# Patient Record
Sex: FEMALE | Race: WHITE | URBAN - METROPOLITAN AREA
[De-identification: names, ages, dates, MRNs, and addresses within clinical notes are randomized per-mention and may not be internally consistent; named-entity substitution may affect disease eponyms.]

---

## 2020-09-24 ENCOUNTER — APPOINTMENT (OUTPATIENT)
Age: 40
Setting detail: DERMATOLOGY
End: 2020-09-24

## 2020-09-24 DIAGNOSIS — D22 MELANOCYTIC NEVI: ICD-10-CM

## 2020-09-24 DIAGNOSIS — Z71.89 OTHER SPECIFIED COUNSELING: ICD-10-CM

## 2020-09-24 DIAGNOSIS — L57.8 OTHER SKIN CHANGES DUE TO CHRONIC EXPOSURE TO NONIONIZING RADIATION: ICD-10-CM

## 2020-09-24 DIAGNOSIS — D18.0 HEMANGIOMA: ICD-10-CM

## 2020-09-24 DIAGNOSIS — L82.1 OTHER SEBORRHEIC KERATOSIS: ICD-10-CM

## 2020-09-24 PROBLEM — D22.71 MELANOCYTIC NEVI OF RIGHT LOWER LIMB, INCLUDING HIP: Status: ACTIVE | Noted: 2020-09-24

## 2020-09-24 PROBLEM — D22.62 MELANOCYTIC NEVI OF LEFT UPPER LIMB, INCLUDING SHOULDER: Status: ACTIVE | Noted: 2020-09-24

## 2020-09-24 PROBLEM — D22.5 MELANOCYTIC NEVI OF TRUNK: Status: ACTIVE | Noted: 2020-09-24

## 2020-09-24 PROBLEM — D22.61 MELANOCYTIC NEVI OF RIGHT UPPER LIMB, INCLUDING SHOULDER: Status: ACTIVE | Noted: 2020-09-24

## 2020-09-24 PROBLEM — D48.5 NEOPLASM OF UNCERTAIN BEHAVIOR OF SKIN: Status: ACTIVE | Noted: 2020-09-24

## 2020-09-24 PROBLEM — D18.01 HEMANGIOMA OF SKIN AND SUBCUTANEOUS TISSUE: Status: ACTIVE | Noted: 2020-09-24

## 2020-09-24 PROBLEM — D22.72 MELANOCYTIC NEVI OF LEFT LOWER LIMB, INCLUDING HIP: Status: ACTIVE | Noted: 2020-09-24

## 2020-09-24 PROCEDURE — OTHER COUNSELING: OTHER

## 2020-09-24 PROCEDURE — OTHER DEFER: OTHER

## 2020-09-24 PROCEDURE — OTHER MIPS QUALITY: OTHER

## 2020-09-24 PROCEDURE — 99203 OFFICE O/P NEW LOW 30 MIN: CPT

## 2020-09-24 PROCEDURE — OTHER ADDITIONAL NOTES: OTHER

## 2020-09-24 PROCEDURE — OTHER SUNSCREEN RECOMMENDATIONS: OTHER

## 2020-09-24 ASSESSMENT — LOCATION DETAILED DESCRIPTION DERM
LOCATION DETAILED: LEFT ANTERIOR SHOULDER
LOCATION DETAILED: LEFT PROXIMAL POSTERIOR UPPER ARM
LOCATION DETAILED: INFERIOR THORACIC SPINE
LOCATION DETAILED: RIGHT DISTAL PRETIBIAL REGION
LOCATION DETAILED: LEFT INFRAMAMMARY CREASE (INNER QUADRANT)
LOCATION DETAILED: MIDDLE STERNUM
LOCATION DETAILED: LEFT ANTERIOR DISTAL THIGH
LOCATION DETAILED: LOWER STERNUM
LOCATION DETAILED: RIGHT RIB CAGE
LOCATION DETAILED: LEFT LATERAL ABDOMEN
LOCATION DETAILED: LEFT DISTAL DORSAL FOREARM
LOCATION DETAILED: LEFT INFERIOR LATERAL MIDBACK
LOCATION DETAILED: RIGHT DISTAL DORSAL FOREARM
LOCATION DETAILED: SUPERIOR THORACIC SPINE
LOCATION DETAILED: RIGHT INFERIOR CENTRAL MALAR CHEEK
LOCATION DETAILED: RIGHT LATERAL PROXIMAL UPPER ARM
LOCATION DETAILED: RIGHT PROXIMAL PRETIBIAL REGION
LOCATION DETAILED: LEFT DISTAL PRETIBIAL REGION
LOCATION DETAILED: RIGHT ANTERIOR SHOULDER
LOCATION DETAILED: RIGHT PROXIMAL POSTERIOR UPPER ARM
LOCATION DETAILED: LEFT INFERIOR CENTRAL MALAR CHEEK

## 2020-09-24 ASSESSMENT — LOCATION SIMPLE DESCRIPTION DERM
LOCATION SIMPLE: RIGHT UPPER ARM
LOCATION SIMPLE: CHEST
LOCATION SIMPLE: LEFT SHOULDER
LOCATION SIMPLE: LEFT PRETIBIAL REGION
LOCATION SIMPLE: RIGHT CHEEK
LOCATION SIMPLE: LEFT LOWER BACK
LOCATION SIMPLE: ABDOMEN
LOCATION SIMPLE: RIGHT POSTERIOR UPPER ARM
LOCATION SIMPLE: LEFT BREAST
LOCATION SIMPLE: LEFT THIGH
LOCATION SIMPLE: LEFT FOREARM
LOCATION SIMPLE: RIGHT SHOULDER
LOCATION SIMPLE: LEFT CHEEK
LOCATION SIMPLE: UPPER BACK
LOCATION SIMPLE: LEFT POSTERIOR UPPER ARM
LOCATION SIMPLE: RIGHT FOREARM
LOCATION SIMPLE: RIGHT PRETIBIAL REGION

## 2020-09-24 ASSESSMENT — LOCATION ZONE DERM
LOCATION ZONE: TRUNK
LOCATION ZONE: FACE
LOCATION ZONE: LEG
LOCATION ZONE: ARM

## 2020-09-24 NOTE — PROCEDURE: MIPS QUALITY
Detail Level: Detailed
Quality 402: Tobacco Use And Help With Quitting Among Adolescents: Patient screened for tobacco and is an ex-smoker
Quality 110: Preventive Care And Screening: Influenza Immunization: Influenza Immunization Administered during Influenza season
Quality 226: Preventive Care And Screening: Tobacco Use: Screening And Cessation Intervention: Patient screened for tobacco use and is an ex/non-smoker

## 2020-09-24 NOTE — PROCEDURE: ADDITIONAL NOTES
Detail Level: Simple
Additional Notes: Patient is going on Lake/fishing trip and will schedule biopsies once she returns.

## 2021-01-21 ENCOUNTER — APPOINTMENT (OUTPATIENT)
Age: 41
Setting detail: DERMATOLOGY
End: 2021-01-21

## 2021-01-21 DIAGNOSIS — D485 NEOPLASM OF UNCERTAIN BEHAVIOR OF SKIN: ICD-10-CM

## 2021-01-21 DIAGNOSIS — L57.8 OTHER SKIN CHANGES DUE TO CHRONIC EXPOSURE TO NONIONIZING RADIATION: ICD-10-CM

## 2021-01-21 DIAGNOSIS — L90.5 SCAR CONDITIONS AND FIBROSIS OF SKIN: ICD-10-CM

## 2021-01-21 PROBLEM — D48.5 NEOPLASM OF UNCERTAIN BEHAVIOR OF SKIN: Status: ACTIVE | Noted: 2021-01-21

## 2021-01-21 PROCEDURE — OTHER TREATMENT REGIMEN: OTHER

## 2021-01-21 PROCEDURE — 11102 TANGNTL BX SKIN SINGLE LES: CPT

## 2021-01-21 PROCEDURE — 99213 OFFICE O/P EST LOW 20 MIN: CPT | Mod: 25

## 2021-01-21 PROCEDURE — 11103 TANGNTL BX SKIN EA SEP/ADDL: CPT

## 2021-01-21 PROCEDURE — OTHER COUNSELING: OTHER

## 2021-01-21 PROCEDURE — OTHER BIOPSY BY SHAVE METHOD: OTHER

## 2021-01-21 PROCEDURE — OTHER MIPS QUALITY: OTHER

## 2021-01-21 ASSESSMENT — LOCATION DETAILED DESCRIPTION DERM
LOCATION DETAILED: RIGHT PERIAREOLAR BREAST 12-1:00 REGION
LOCATION DETAILED: LEFT INFERIOR CENTRAL MALAR CHEEK
LOCATION DETAILED: RIGHT LATERAL PROXIMAL UPPER ARM
LOCATION DETAILED: MIDDLE STERNUM
LOCATION DETAILED: LEFT RIB CAGE
LOCATION DETAILED: LOWER STERNUM

## 2021-01-21 ASSESSMENT — LOCATION ZONE DERM
LOCATION ZONE: FACE
LOCATION ZONE: TRUNK
LOCATION ZONE: ARM

## 2021-01-21 ASSESSMENT — LOCATION SIMPLE DESCRIPTION DERM
LOCATION SIMPLE: LEFT CHEEK
LOCATION SIMPLE: RIGHT BREAST
LOCATION SIMPLE: RIGHT UPPER ARM
LOCATION SIMPLE: ABDOMEN
LOCATION SIMPLE: CHEST

## 2021-01-21 NOTE — PROCEDURE: TREATMENT REGIMEN
Detail Level: Zone
Plan: Follow up with plastic surgeon\\nFaxed medical release form for patients biopsy results

## 2021-01-21 NOTE — PROCEDURE: BIOPSY BY SHAVE METHOD
Validate Lesion Size: No
Type Of Destruction Used: Curettage
Depth Of Biopsy: dermis
Notification Instructions: Patient will be notified of biopsy results. However, patient instructed to call the office if not contacted within 2 weeks.
Biopsy Type: H and E
Electrodesiccation And Curettage Text: The wound bed was treated with electrodesiccation and curettage after the biopsy was performed.
Electrodesiccation Text: The wound bed was treated with electrodesiccation after the biopsy was performed.
Size Of Lesion In Cm: 0.5
Wound Care: Petrolatum
Anesthesia Type: 1% lidocaine with epinephrine
Dressing: bandage
Additional Anesthesia Volume In Cc (Will Not Render If 0): 0
Post-Care Instructions: I reviewed with the patient in detail post-care instructions. Patient is to keep the biopsy site dry overnight, and then apply bacitracin twice daily until healed. Patient may apply hydrogen peroxide soaks to remove any crusting.
Cryotherapy Text: The wound bed was treated with cryotherapy after the biopsy was performed.
Silver Nitrate Text: The wound bed was treated with silver nitrate after the biopsy was performed.
Billing Type: Third-Party Bill
Curettage Text: The wound bed was treated with curettage after the biopsy was performed.
Biopsy Method: Dermablade
Detail Level: Detailed
Consent: Written consent was obtained and risks were reviewed including but not limited to scarring, infection, bleeding, scabbing, incomplete removal, nerve damage and allergy to anesthesia.
Hemostasis: Drysol
Was A Bandage Applied: Yes
Information: Selecting Yes will display possible errors in your note based on the variables you have selected. This validation is only offered as a suggestion for you. PLEASE NOTE THAT THE VALIDATION TEXT WILL BE REMOVED WHEN YOU FINALIZE YOUR NOTE. IF YOU WANT TO FAX A PRELIMINARY NOTE YOU WILL NEED TO TOGGLE THIS TO 'NO' IF YOU DO NOT WANT IT IN YOUR FAXED NOTE.
Size Of Lesion In Cm: 0.6

## 2021-02-04 ENCOUNTER — RX ONLY (RX ONLY)
Age: 41
End: 2021-02-04

## 2021-02-04 ENCOUNTER — APPOINTMENT (OUTPATIENT)
Age: 41
Setting detail: DERMATOLOGY
End: 2021-02-05

## 2021-02-04 DIAGNOSIS — D485 NEOPLASM OF UNCERTAIN BEHAVIOR OF SKIN: ICD-10-CM

## 2021-02-04 DIAGNOSIS — B00.1 HERPESVIRAL VESICULAR DERMATITIS: ICD-10-CM

## 2021-02-04 DIAGNOSIS — L91.0 HYPERTROPHIC SCAR: ICD-10-CM

## 2021-02-04 DIAGNOSIS — L57.8 OTHER SKIN CHANGES DUE TO CHRONIC EXPOSURE TO NONIONIZING RADIATION: ICD-10-CM

## 2021-02-04 PROBLEM — D48.5 NEOPLASM OF UNCERTAIN BEHAVIOR OF SKIN: Status: ACTIVE | Noted: 2021-02-04

## 2021-02-04 PROCEDURE — OTHER BIOPSY BY SHAVE METHOD: OTHER

## 2021-02-04 PROCEDURE — 99213 OFFICE O/P EST LOW 20 MIN: CPT | Mod: 25

## 2021-02-04 PROCEDURE — OTHER TREATMENT REGIMEN: OTHER

## 2021-02-04 PROCEDURE — 11102 TANGNTL BX SKIN SINGLE LES: CPT

## 2021-02-04 PROCEDURE — 11103 TANGNTL BX SKIN EA SEP/ADDL: CPT

## 2021-02-04 PROCEDURE — OTHER COUNSELING: OTHER

## 2021-02-04 PROCEDURE — OTHER MIPS QUALITY: OTHER

## 2021-02-04 RX ORDER — ACYCLOVIR 200 MG/1
CAPSULE ORAL
Qty: 25 | Refills: 3 | Status: ERX | COMMUNITY
Start: 2021-02-04

## 2021-02-04 ASSESSMENT — LOCATION ZONE DERM
LOCATION ZONE: TRUNK
LOCATION ZONE: LIP
LOCATION ZONE: NECK
LOCATION ZONE: LEG

## 2021-02-04 ASSESSMENT — LOCATION SIMPLE DESCRIPTION DERM
LOCATION SIMPLE: RIGHT LOWER BACK
LOCATION SIMPLE: LEFT THIGH
LOCATION SIMPLE: LEFT LIP
LOCATION SIMPLE: POSTERIOR NECK
LOCATION SIMPLE: LEFT UPPER BACK

## 2021-02-04 ASSESSMENT — LOCATION DETAILED DESCRIPTION DERM
LOCATION DETAILED: LEFT ANTERIOR DISTAL THIGH
LOCATION DETAILED: RIGHT MEDIAL TRAPEZIAL NECK
LOCATION DETAILED: RIGHT INFERIOR LATERAL LOWER BACK
LOCATION DETAILED: LEFT MID-UPPER BACK
LOCATION DETAILED: LEFT INFERIOR VERMILION LIP
LOCATION DETAILED: LEFT INFERIOR UPPER BACK

## 2021-02-04 NOTE — PROCEDURE: BIOPSY BY SHAVE METHOD
Billing Type: Third-Party Bill
Electrodesiccation And Curettage Text: The wound bed was treated with electrodesiccation and curettage after the biopsy was performed.
Information: Selecting Yes will display possible errors in your note based on the variables you have selected. This validation is only offered as a suggestion for you. PLEASE NOTE THAT THE VALIDATION TEXT WILL BE REMOVED WHEN YOU FINALIZE YOUR NOTE. IF YOU WANT TO FAX A PRELIMINARY NOTE YOU WILL NEED TO TOGGLE THIS TO 'NO' IF YOU DO NOT WANT IT IN YOUR FAXED NOTE.
Hide Anesthesia Volume?: No
Silver Nitrate Text: The wound bed was treated with silver nitrate after the biopsy was performed.
Size Of Lesion In Cm: 0
Notification Instructions: Patient will be notified of biopsy results. However, patient instructed to call the office if not contacted within 2 weeks.
Hemostasis: Drysol
Dressing: bandage
Biopsy Method: Dermablade
Type Of Destruction Used: Curettage
Detail Level: Detailed
Electrodesiccation Text: The wound bed was treated with electrodesiccation after the biopsy was performed.
Depth Of Biopsy: dermis
Biopsy Type: H and E
Anesthesia Type: 1% lidocaine with epinephrine
Cryotherapy Text: The wound bed was treated with cryotherapy after the biopsy was performed.
Was A Bandage Applied: Yes
Anesthesia Volume In Cc (Will Not Render If 0): 0.5
Curettage Text: The wound bed was treated with curettage after the biopsy was performed.
Wound Care: Petrolatum
Post-Care Instructions: I reviewed with the patient in detail post-care instructions. Patient is to keep the biopsy site dry overnight, and then apply bacitracin twice daily until healed. Patient may apply hydrogen peroxide soaks to remove any crusting.
Consent: Written consent was obtained and risks were reviewed including but not limited to scarring, infection, bleeding, scabbing, incomplete removal, nerve damage and allergy to anesthesia.

## 2021-03-19 ENCOUNTER — APPOINTMENT (OUTPATIENT)
Age: 41
Setting detail: DERMATOLOGY
End: 2021-03-19

## 2021-03-19 DIAGNOSIS — L57.8 OTHER SKIN CHANGES DUE TO CHRONIC EXPOSURE TO NONIONIZING RADIATION: ICD-10-CM

## 2021-03-19 DIAGNOSIS — D485 NEOPLASM OF UNCERTAIN BEHAVIOR OF SKIN: ICD-10-CM

## 2021-03-19 DIAGNOSIS — Z71.89 OTHER SPECIFIED COUNSELING: ICD-10-CM

## 2021-03-19 PROBLEM — D48.5 NEOPLASM OF UNCERTAIN BEHAVIOR OF SKIN: Status: ACTIVE | Noted: 2021-03-19

## 2021-03-19 PROCEDURE — OTHER BIOPSY BY SHAVE METHOD: OTHER

## 2021-03-19 PROCEDURE — 11102 TANGNTL BX SKIN SINGLE LES: CPT

## 2021-03-19 PROCEDURE — OTHER COUNSELING: OTHER

## 2021-03-19 PROCEDURE — 99213 OFFICE O/P EST LOW 20 MIN: CPT | Mod: 25

## 2021-03-19 PROCEDURE — 11103 TANGNTL BX SKIN EA SEP/ADDL: CPT

## 2021-03-19 ASSESSMENT — LOCATION SIMPLE DESCRIPTION DERM
LOCATION SIMPLE: SUPERIOR FOREHEAD
LOCATION SIMPLE: LEFT UPPER BACK
LOCATION SIMPLE: LEFT LOWER BACK
LOCATION SIMPLE: RIGHT UPPER BACK
LOCATION SIMPLE: CHEST

## 2021-03-19 ASSESSMENT — LOCATION DETAILED DESCRIPTION DERM
LOCATION DETAILED: SUPERIOR MID FOREHEAD
LOCATION DETAILED: UPPER STERNUM
LOCATION DETAILED: RIGHT MEDIAL UPPER BACK
LOCATION DETAILED: LEFT MID-UPPER BACK
LOCATION DETAILED: LEFT SUPERIOR MEDIAL MIDBACK

## 2021-03-19 ASSESSMENT — LOCATION ZONE DERM
LOCATION ZONE: FACE
LOCATION ZONE: TRUNK

## 2021-03-19 NOTE — PROCEDURE: BIOPSY BY SHAVE METHOD
Biopsy Type: H and E
Anesthesia Volume In Cc (Will Not Render If 0): 0.5
Hide Topical Anesthesia?: No
Biopsy Method: Dermablade
Consent: Written consent was obtained and risks were reviewed including but not limited to scarring, infection, bleeding, scabbing, incomplete removal, nerve damage and allergy to anesthesia.
Silver Nitrate Text: The wound bed was treated with silver nitrate after the biopsy was performed.
Dressing: bandage
Detail Level: Detailed
Cryotherapy Text: The wound bed was treated with cryotherapy after the biopsy was performed.
Anesthesia Type: 1% lidocaine with epinephrine
Depth Of Biopsy: dermis
Additional Anesthesia Volume In Cc (Will Not Render If 0): 0
Notification Instructions: Patient will be notified of biopsy results. However, patient instructed to call the office if not contacted within 2 weeks.
Curettage Text: The wound bed was treated with curettage after the biopsy was performed.
Type Of Destruction Used: Curettage
Billing Type: Third-Party Bill
Electrodesiccation And Curettage Text: The wound bed was treated with electrodesiccation and curettage after the biopsy was performed.
Information: Selecting Yes will display possible errors in your note based on the variables you have selected. This validation is only offered as a suggestion for you. PLEASE NOTE THAT THE VALIDATION TEXT WILL BE REMOVED WHEN YOU FINALIZE YOUR NOTE. IF YOU WANT TO FAX A PRELIMINARY NOTE YOU WILL NEED TO TOGGLE THIS TO 'NO' IF YOU DO NOT WANT IT IN YOUR FAXED NOTE.
Wound Care: Petrolatum
Post-Care Instructions: I reviewed with the patient in detail post-care instructions. Patient is to keep the biopsy site dry overnight, and then apply bacitracin twice daily until healed. Patient may apply hydrogen peroxide soaks to remove any crusting.
Was A Bandage Applied: Yes
Hemostasis: Drysol
Electrodesiccation Text: The wound bed was treated with electrodesiccation after the biopsy was performed.

## 2024-07-09 ENCOUNTER — INPATIENT (INPATIENT)
Facility: HOSPITAL | Age: 44
LOS: 7 days | Discharge: NURSING, REHAB OR LONG-TERM CARE | DRG: 897 | End: 2024-07-17
Attending: FAMILY MEDICINE | Admitting: HOSPITALIST
Payer: MEDICAID

## 2024-07-09 VITALS
TEMPERATURE: 98 F | WEIGHT: 143.96 LBS | OXYGEN SATURATION: 99 % | DIASTOLIC BLOOD PRESSURE: 68 MMHG | HEIGHT: 64 IN | RESPIRATION RATE: 17 BRPM | SYSTOLIC BLOOD PRESSURE: 160 MMHG | HEART RATE: 104 BPM

## 2024-07-09 DIAGNOSIS — Z98.890 OTHER SPECIFIED POSTPROCEDURAL STATES: Chronic | ICD-10-CM

## 2024-07-09 DIAGNOSIS — F10.929 ALCOHOL USE, UNSPECIFIED WITH INTOXICATION, UNSPECIFIED: ICD-10-CM

## 2024-07-09 LAB
AMPHET UR-MCNC: NEGATIVE — SIGNIFICANT CHANGE UP
ANION GAP SERPL CALC-SCNC: 8 MMOL/L — SIGNIFICANT CHANGE UP (ref 5–17)
APPEARANCE UR: CLEAR — SIGNIFICANT CHANGE UP
BARBITURATES UR SCN-MCNC: NEGATIVE — SIGNIFICANT CHANGE UP
BASOPHILS # BLD AUTO: 0.08 K/UL — SIGNIFICANT CHANGE UP (ref 0–0.2)
BASOPHILS NFR BLD AUTO: 0.9 % — SIGNIFICANT CHANGE UP (ref 0–2)
BENZODIAZ UR-MCNC: NEGATIVE — SIGNIFICANT CHANGE UP
BILIRUB UR-MCNC: NEGATIVE — SIGNIFICANT CHANGE UP
BUN SERPL-MCNC: 24 MG/DL — HIGH (ref 7–23)
CALCIUM SERPL-MCNC: 9.5 MG/DL — SIGNIFICANT CHANGE UP (ref 8.4–10.5)
CHLORIDE SERPL-SCNC: 105 MMOL/L — SIGNIFICANT CHANGE UP (ref 96–108)
CO2 SERPL-SCNC: 25 MMOL/L — SIGNIFICANT CHANGE UP (ref 22–31)
COCAINE METAB.OTHER UR-MCNC: NEGATIVE — SIGNIFICANT CHANGE UP
COLOR SPEC: YELLOW — SIGNIFICANT CHANGE UP
CREAT SERPL-MCNC: 0.6 MG/DL — SIGNIFICANT CHANGE UP (ref 0.5–1.3)
DIFF PNL FLD: NEGATIVE — SIGNIFICANT CHANGE UP
EGFR: 114 ML/MIN/1.73M2 — SIGNIFICANT CHANGE UP
EOSINOPHIL # BLD AUTO: 0 K/UL — SIGNIFICANT CHANGE UP (ref 0–0.5)
EOSINOPHIL NFR BLD AUTO: 0 % — SIGNIFICANT CHANGE UP (ref 0–6)
ETHANOL SERPL-MCNC: 335 MG/DL — HIGH (ref 0–3)
GLUCOSE SERPL-MCNC: 103 MG/DL — HIGH (ref 70–99)
GLUCOSE UR QL: NEGATIVE MG/DL — SIGNIFICANT CHANGE UP
HCG SERPL-ACNC: <1 MIU/ML — SIGNIFICANT CHANGE UP
HCT VFR BLD CALC: 34.1 % — LOW (ref 34.5–45)
HGB BLD-MCNC: 11.6 G/DL — SIGNIFICANT CHANGE UP (ref 11.5–15.5)
IMM GRANULOCYTES NFR BLD AUTO: 0.3 % — SIGNIFICANT CHANGE UP (ref 0–0.9)
KETONES UR-MCNC: NEGATIVE MG/DL — SIGNIFICANT CHANGE UP
LEUKOCYTE ESTERASE UR-ACNC: NEGATIVE — SIGNIFICANT CHANGE UP
LYMPHOCYTES # BLD AUTO: 2.57 K/UL — SIGNIFICANT CHANGE UP (ref 1–3.3)
LYMPHOCYTES # BLD AUTO: 27.5 % — SIGNIFICANT CHANGE UP (ref 13–44)
MCHC RBC-ENTMCNC: 30.9 PG — SIGNIFICANT CHANGE UP (ref 27–34)
MCHC RBC-ENTMCNC: 34 GM/DL — SIGNIFICANT CHANGE UP (ref 32–36)
MCV RBC AUTO: 90.7 FL — SIGNIFICANT CHANGE UP (ref 80–100)
METHADONE UR-MCNC: NEGATIVE — SIGNIFICANT CHANGE UP
MONOCYTES # BLD AUTO: 0.6 K/UL — SIGNIFICANT CHANGE UP (ref 0–0.9)
MONOCYTES NFR BLD AUTO: 6.4 % — SIGNIFICANT CHANGE UP (ref 2–14)
NEUTROPHILS # BLD AUTO: 6.06 K/UL — SIGNIFICANT CHANGE UP (ref 1.8–7.4)
NEUTROPHILS NFR BLD AUTO: 64.9 % — SIGNIFICANT CHANGE UP (ref 43–77)
NITRITE UR-MCNC: NEGATIVE — SIGNIFICANT CHANGE UP
NRBC # BLD: 0 /100 WBCS — SIGNIFICANT CHANGE UP (ref 0–0)
OPIATES UR-MCNC: NEGATIVE — SIGNIFICANT CHANGE UP
PCP SPEC-MCNC: SIGNIFICANT CHANGE UP
PCP UR-MCNC: NEGATIVE — SIGNIFICANT CHANGE UP
PH UR: 6 — SIGNIFICANT CHANGE UP (ref 5–8)
PLATELET # BLD AUTO: 479 K/UL — HIGH (ref 150–400)
POTASSIUM SERPL-MCNC: 4.1 MMOL/L — SIGNIFICANT CHANGE UP (ref 3.5–5.3)
POTASSIUM SERPL-SCNC: 4.1 MMOL/L — SIGNIFICANT CHANGE UP (ref 3.5–5.3)
PROT UR-MCNC: NEGATIVE MG/DL — SIGNIFICANT CHANGE UP
RBC # BLD: 3.76 M/UL — LOW (ref 3.8–5.2)
RBC # FLD: 14.9 % — HIGH (ref 10.3–14.5)
SARS-COV-2 RNA SPEC QL NAA+PROBE: SIGNIFICANT CHANGE UP
SODIUM SERPL-SCNC: 138 MMOL/L — SIGNIFICANT CHANGE UP (ref 135–145)
SP GR SPEC: 1.01 — SIGNIFICANT CHANGE UP (ref 1–1.03)
THC UR QL: NEGATIVE — SIGNIFICANT CHANGE UP
UROBILINOGEN FLD QL: 0.2 MG/DL — SIGNIFICANT CHANGE UP (ref 0.2–1)
WBC # BLD: 9.34 K/UL — SIGNIFICANT CHANGE UP (ref 3.8–10.5)
WBC # FLD AUTO: 9.34 K/UL — SIGNIFICANT CHANGE UP (ref 3.8–10.5)

## 2024-07-09 PROCEDURE — 99223 1ST HOSP IP/OBS HIGH 75: CPT

## 2024-07-09 PROCEDURE — 93010 ELECTROCARDIOGRAM REPORT: CPT

## 2024-07-09 PROCEDURE — 99285 EMERGENCY DEPT VISIT HI MDM: CPT

## 2024-07-09 RX ORDER — SODIUM CHLORIDE 0.9 % (FLUSH) 0.9 %
1000 SYRINGE (ML) INJECTION ONCE
Refills: 0 | Status: COMPLETED | OUTPATIENT
Start: 2024-07-09 | End: 2024-07-09

## 2024-07-09 RX ORDER — PANTOPRAZOLE SODIUM 40 MG/10ML
40 INJECTION, POWDER, FOR SOLUTION INTRAVENOUS
Refills: 0 | Status: DISCONTINUED | OUTPATIENT
Start: 2024-07-09 | End: 2024-07-17

## 2024-07-09 RX ORDER — SODIUM CHLORIDE 0.9 % (FLUSH) 0.9 %
1000 SYRINGE (ML) INJECTION
Refills: 0 | Status: DISCONTINUED | OUTPATIENT
Start: 2024-07-09 | End: 2024-07-10

## 2024-07-09 RX ORDER — MAGNESIUM SULFATE 100 %
1 POWDER (GRAM) MISCELLANEOUS ONCE
Refills: 0 | Status: COMPLETED | OUTPATIENT
Start: 2024-07-09 | End: 2024-07-09

## 2024-07-09 RX ORDER — OMEPRAZOLE 10 MG/1
1 CAPSULE, DELAYED RELEASE ORAL
Refills: 0 | DISCHARGE

## 2024-07-09 RX ORDER — TRAZODONE HYDROCHLORIDE 50 MG/1
1 TABLET, FILM COATED ORAL
Refills: 0 | DISCHARGE

## 2024-07-09 RX ORDER — LORAZEPAM 0.5 MG
2 TABLET ORAL ONCE
Refills: 0 | Status: DISCONTINUED | OUTPATIENT
Start: 2024-07-09 | End: 2024-07-09

## 2024-07-09 RX ORDER — THIAMINE HCL 100 MG
100 TABLET ORAL DAILY
Refills: 0 | Status: DISCONTINUED | OUTPATIENT
Start: 2024-07-10 | End: 2024-07-17

## 2024-07-09 RX ORDER — THIAMINE HCL 100 MG
100 TABLET ORAL ONCE
Refills: 0 | Status: COMPLETED | OUTPATIENT
Start: 2024-07-09 | End: 2024-07-09

## 2024-07-09 RX ORDER — SERTRALINE HYDROCHLORIDE 100 MG/1
50 TABLET, FILM COATED ORAL DAILY
Refills: 0 | Status: DISCONTINUED | OUTPATIENT
Start: 2024-07-09 | End: 2024-07-16

## 2024-07-09 RX ORDER — ENOXAPARIN SODIUM 100 MG/ML
40 INJECTION SUBCUTANEOUS EVERY 24 HOURS
Refills: 0 | Status: DISCONTINUED | OUTPATIENT
Start: 2024-07-09 | End: 2024-07-17

## 2024-07-09 RX ORDER — LORAZEPAM 0.5 MG
2 TABLET ORAL
Refills: 0 | Status: DISCONTINUED | OUTPATIENT
Start: 2024-07-09 | End: 2024-07-16

## 2024-07-09 RX ORDER — FOLIC ACID
1 POWDER (GRAM) MISCELLANEOUS DAILY
Refills: 0 | Status: DISCONTINUED | OUTPATIENT
Start: 2024-07-09 | End: 2024-07-17

## 2024-07-09 RX ADMIN — Medication 1000 MILLILITER(S): at 15:37

## 2024-07-09 RX ADMIN — Medication 1000 MILLILITER(S): at 14:37

## 2024-07-09 RX ADMIN — Medication 2 MILLIGRAM(S): at 21:50

## 2024-07-09 RX ADMIN — Medication 100 MILLIGRAM(S): at 14:36

## 2024-07-09 RX ADMIN — Medication 100 MILLILITER(S): at 21:51

## 2024-07-09 RX ADMIN — Medication 100 GRAM(S): at 14:37

## 2024-07-09 RX ADMIN — Medication 1 GRAM(S): at 15:37

## 2024-07-09 RX ADMIN — Medication 2 MILLIGRAM(S): at 14:33

## 2024-07-09 NOTE — ED ADULT NURSE NOTE - OBJECTIVE STATEMENT
44 yr old female to ED for complaints of alcohol intoxication. Patient BIB EMS intoxicated. Patient states she has been drinking Vodka all morning. Alert, anxious, agitated. Dr. Coon evaluating patient. All belongings removed. Patient denies vomiting, nausea, chest pain. Patient placed in yellow gown. All belongings removed, Safety maintained.

## 2024-07-09 NOTE — ED PROVIDER NOTE - OBJECTIVE STATEMENT
44-year-old female came to the emergency room chief complaint of intoxicated brought in by the EMS patient admits to drinking a lot of alcohol patient has a history of alcohol abuse patient also has been in relapse lately Patient was preparing to go to alcohol rehab but was rejected because the patient was intoxicated

## 2024-07-09 NOTE — ED ADULT NURSE NOTE - NSFALLHARMRISKINTERV_ED_ALL_ED
Assistance OOB with selected safe patient handling equipment if applicable/Communicate risk of Fall with Harm to all staff, patient, and family/Provide patient with walking aids/Provide visual cue: red socks, yellow wristband, yellow gown, etc/Reinforce activity limits and safety measures with patient and family/Use of alarms - bed, stretcher, chair and/or video monitoring/Bed in lowest position, wheels locked, appropriate side rails in place/Call bell, personal items and telephone in reach/Instruct patient to call for assistance before getting out of bed/chair/stretcher/Non-slip footwear applied when patient is off stretcher/[place] to call system/Physically safe environment - no spills, clutter or unnecessary equipment/Purposeful Proactive Rounding/Room/bathroom lighting operational, light cord in reach

## 2024-07-09 NOTE — H&P ADULT - NSHPLABSRESULTS_GEN_ALL_CORE
11.6   9.34  )-----------( 479      ( 2024 13:48 )             34.1       07-    138  |  105  |  24<H>  ----------------------------<  103<H>  4.1   |  25  |  0.60    Ca    9.5      2024 13:48                Urinalysis Basic - ( 2024 13:48 )    Color: Yellow / Appearance: Clear / S.011 / pH: x  Gluc: 103 mg/dL / Ketone: Negative mg/dL  / Bili: Negative / Urobili: 0.2 mg/dL   Blood: x / Protein: Negative mg/dL / Nitrite: Negative   Leuk Esterase: Negative / RBC: x / WBC x   Sq Epi: x / Non Sq Epi: x / Bacteria: x            Lactate Trend            CAPILLARY BLOOD GLUCOSE

## 2024-07-09 NOTE — ED ADULT NURSE NOTE - IN ACCORDANCE WITH [place] LAW, WE OFFER EVERY PATIENT A HEPATITIS C TEST. WOULD YOU LIKE TO BE TESTED TODAY?
Opt out [[email or web address]_ALL_OB_FT:[id number]],[NS_DeliveryRN_OBGYN_ALL_OB_FT:[id number]],[NS_DeliveryAssist1_OBGYN_ALL_OB_FT:[id number]=]
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
## 2024-07-09 NOTE — H&P ADULT - NSHPPHYSICALEXAM_GEN_ALL_CORE
Vital Signs Last 24 Hrs  T(F): 98.4 (09 Jul 2024 13:15), Max: 98.4 (09 Jul 2024 13:15)  HR: 94 (09 Jul 2024 15:53) (94 - 104)  BP: 114/63 (09 Jul 2024 16:44) (114/63 - 160/68)  RR: 17 (09 Jul 2024 13:15) (17 - 17)  SpO2: 99% (09 Jul 2024 15:53) (99% - 99%)    PHYSICAL EXAM:  GENERAL: NAD, well-groomed, well-developed, speech slurred  HEAD:  Atraumatic, Normocephalic  EYES: EOMI, conjunctiva and sclera clear  ENMT: Moist mucous membranes, Good dentition, no thrush  NECK: Supple, No JVD  CHEST/LUNG: Clear to auscultation bilaterally, good air entry, non-labored breathing  HEART: RRR; S1/S2, No murmur  ABDOMEN: Soft, Nontender, Nondistended; Bowel sounds present  EXTREMITIES: No calf tenderness, No cyanosis, No edema  SKIN: Warm, Intact  PSYCH: Normal mood, Normal affect  NERVOUS SYSTEM:  A/O x2, impaired concentration;  No focal deficits

## 2024-07-09 NOTE — ED PROVIDER NOTE - CLINICAL SUMMARY MEDICAL DECISION MAKING FREE TEXT BOX
44-year-old female came to the emergency room chief complaint of intoxicated brought in by the EMS patient admits to drinking a lot of alcohol patient has a history of alcohol abuse patient also has been in relapse lately Patient was preparing to go to alcohol rehab but was rejected because the patient was intoxicated  CBC CMP alcohol level urine toxicology IV fluids thiamine and magnesium do the CIWA score reevaluate likely admit the patient for detox or outpatient referral for detox
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
## 2024-07-09 NOTE — ED PROVIDER NOTE - PHYSICAL EXAMINATION
General:     NAD, well-nourished, well-appearing  Head:     NC/AT, EOMI, oral mucosa moist alcohol on breath  Neck:     trachea midline  Lungs:     CTA b/l, no w/r/r  CVS:     S1S2, RRR, no m/g/r  Abd:     +BS, s/nt/nd, no organomegaly  Ext:    2+ radial and pedal pulses, no c/c/e  Neuro: AAOx3, no sensory/motor deficits  Patient ataxic unsteady gait due to intoxication and mild slurred speech

## 2024-07-09 NOTE — PATIENT PROFILE ADULT - NSTOBACCOCESSATIONEDU4_GEN_A_NUR
Your Hemoglobin is normal.   Your platelets are normal  Your electrolytes are normal  Your glucose is abnormal.  Continue current treatment.  We need you to reduce carbohydrate intake.  Diet and Exercise.  Follow up in 3 months for level check.  Your kidney function is normal.  Your liver function is normal.  Your cholesterol panel is normal.  Your Thyroid test is normal.   Your vitamin D level is low. You need treatment with prescription dose of Vitamin D.  I have sent the medication to the pharmacy. Recommend follow up in 3-6 months for level check.  Your Vitamin B12 is normal.  Your folic acid is normal  Your urine test is stable.    Hepatitis C Antibody: Your hepatitis C antibody is negative Smoking even a single puff increases the likelihood of a full relapse, withdrawal symptoms peak within 1-2 weeks, but can persist for months

## 2024-07-09 NOTE — ED ADULT TRIAGE NOTE - CHIEF COMPLAINT QUOTE
Patient brought to ED via EMS for complaint of drinking alcohol beverages this miorning after several years of no drinking. Alert and oriented x 4 currently. No complaint of nausea, vomiting, dizziness or SOB. Denies wanting to hurt self, SI or HI.
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
## 2024-07-09 NOTE — ED PROVIDER NOTE - CARE PLAN
Principal Discharge DX:	Alcohol intoxication   1 Principal Discharge DX:	Alcohol intoxication  Secondary Diagnosis:	Alcohol abuse  Secondary Diagnosis:	At risk for alcohol withdrawal

## 2024-07-09 NOTE — PATIENT PROFILE ADULT - FALL HARM RISK - RISK INTERVENTIONS
Assistance with ambulation/Communicate Fall Risk and Risk Factors to all staff, patient, and family/Reinforce activity limits and safety measures with patient and family/Visual Cue: Yellow wristband/Bed in lowest position, wheels locked, appropriate side rails in place/Call bell, personal items and telephone in reach/Instruct patient to call for assistance before getting out of bed or chair/Non-slip footwear when patient is out of bed/[place] to call system/Physically safe environment - no spills, clutter or unnecessary equipment/Purposeful Proactive Rounding/Room/bathroom lighting operational, light cord in reach
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
## 2024-07-10 LAB
ALBUMIN SERPL ELPH-MCNC: 2.8 G/DL — LOW (ref 3.3–5)
ALP SERPL-CCNC: 86 U/L — SIGNIFICANT CHANGE UP (ref 40–120)
ALT FLD-CCNC: 38 U/L — SIGNIFICANT CHANGE UP (ref 10–45)
ANION GAP SERPL CALC-SCNC: 7 MMOL/L — SIGNIFICANT CHANGE UP (ref 5–17)
ANION GAP SERPL CALC-SCNC: 9 MMOL/L — SIGNIFICANT CHANGE UP (ref 5–17)
AST SERPL-CCNC: 32 U/L — SIGNIFICANT CHANGE UP (ref 10–40)
BILIRUB SERPL-MCNC: 0.4 MG/DL — SIGNIFICANT CHANGE UP (ref 0.2–1.2)
BUN SERPL-MCNC: 18 MG/DL — SIGNIFICANT CHANGE UP (ref 7–23)
BUN SERPL-MCNC: 18 MG/DL — SIGNIFICANT CHANGE UP (ref 7–23)
CALCIUM SERPL-MCNC: 8.6 MG/DL — SIGNIFICANT CHANGE UP (ref 8.4–10.5)
CALCIUM SERPL-MCNC: 8.6 MG/DL — SIGNIFICANT CHANGE UP (ref 8.4–10.5)
CHLORIDE SERPL-SCNC: 102 MMOL/L — SIGNIFICANT CHANGE UP (ref 96–108)
CHLORIDE SERPL-SCNC: 104 MMOL/L — SIGNIFICANT CHANGE UP (ref 96–108)
CO2 SERPL-SCNC: 24 MMOL/L — SIGNIFICANT CHANGE UP (ref 22–31)
CO2 SERPL-SCNC: 24 MMOL/L — SIGNIFICANT CHANGE UP (ref 22–31)
CREAT SERPL-MCNC: 0.6 MG/DL — SIGNIFICANT CHANGE UP (ref 0.5–1.3)
CREAT SERPL-MCNC: 0.65 MG/DL — SIGNIFICANT CHANGE UP (ref 0.5–1.3)
EGFR: 112 ML/MIN/1.73M2 — SIGNIFICANT CHANGE UP
EGFR: 114 ML/MIN/1.73M2 — SIGNIFICANT CHANGE UP
GLUCOSE SERPL-MCNC: 115 MG/DL — HIGH (ref 70–99)
GLUCOSE SERPL-MCNC: 115 MG/DL — HIGH (ref 70–99)
HCT VFR BLD CALC: 32.3 % — LOW (ref 34.5–45)
HGB BLD-MCNC: 10.8 G/DL — LOW (ref 11.5–15.5)
MAGNESIUM SERPL-MCNC: 1.5 MG/DL — LOW (ref 1.6–2.6)
MCHC RBC-ENTMCNC: 30.5 PG — SIGNIFICANT CHANGE UP (ref 27–34)
MCHC RBC-ENTMCNC: 33.4 GM/DL — SIGNIFICANT CHANGE UP (ref 32–36)
MCV RBC AUTO: 91.2 FL — SIGNIFICANT CHANGE UP (ref 80–100)
NRBC # BLD: 0 /100 WBCS — SIGNIFICANT CHANGE UP (ref 0–0)
PHOSPHATE SERPL-MCNC: 2.8 MG/DL — SIGNIFICANT CHANGE UP (ref 2.5–4.5)
PLATELET # BLD AUTO: 393 K/UL — SIGNIFICANT CHANGE UP (ref 150–400)
POTASSIUM SERPL-MCNC: 3.6 MMOL/L — SIGNIFICANT CHANGE UP (ref 3.5–5.3)
POTASSIUM SERPL-MCNC: 3.6 MMOL/L — SIGNIFICANT CHANGE UP (ref 3.5–5.3)
POTASSIUM SERPL-SCNC: 3.6 MMOL/L — SIGNIFICANT CHANGE UP (ref 3.5–5.3)
POTASSIUM SERPL-SCNC: 3.6 MMOL/L — SIGNIFICANT CHANGE UP (ref 3.5–5.3)
PROT SERPL-MCNC: 6.9 G/DL — SIGNIFICANT CHANGE UP (ref 6–8.3)
RBC # BLD: 3.54 M/UL — LOW (ref 3.8–5.2)
RBC # FLD: 14.9 % — HIGH (ref 10.3–14.5)
SODIUM SERPL-SCNC: 135 MMOL/L — SIGNIFICANT CHANGE UP (ref 135–145)
SODIUM SERPL-SCNC: 135 MMOL/L — SIGNIFICANT CHANGE UP (ref 135–145)
WBC # BLD: 6.27 K/UL — SIGNIFICANT CHANGE UP (ref 3.8–10.5)
WBC # FLD AUTO: 6.27 K/UL — SIGNIFICANT CHANGE UP (ref 3.8–10.5)

## 2024-07-10 PROCEDURE — 99233 SBSQ HOSP IP/OBS HIGH 50: CPT

## 2024-07-10 RX ORDER — MAGNESIUM SULFATE 100 %
2 POWDER (GRAM) MISCELLANEOUS ONCE
Refills: 0 | Status: COMPLETED | OUTPATIENT
Start: 2024-07-10 | End: 2024-07-10

## 2024-07-10 RX ORDER — NICOTINE POLACRILEX 2 MG/1
1 LOZENGE ORAL ONCE
Refills: 0 | Status: COMPLETED | OUTPATIENT
Start: 2024-07-10 | End: 2024-07-10

## 2024-07-10 RX ORDER — ACETAMINOPHEN 325 MG
650 TABLET ORAL ONCE
Refills: 0 | Status: COMPLETED | OUTPATIENT
Start: 2024-07-10 | End: 2024-07-10

## 2024-07-10 RX ADMIN — PANTOPRAZOLE SODIUM 40 MILLIGRAM(S): 40 INJECTION, POWDER, FOR SOLUTION INTRAVENOUS at 06:04

## 2024-07-10 RX ADMIN — NICOTINE POLACRILEX 1 PATCH: 2 LOZENGE ORAL at 19:36

## 2024-07-10 RX ADMIN — Medication 2 MILLIGRAM(S): at 15:27

## 2024-07-10 RX ADMIN — ENOXAPARIN SODIUM 40 MILLIGRAM(S): 100 INJECTION SUBCUTANEOUS at 06:05

## 2024-07-10 RX ADMIN — Medication 2 MILLIGRAM(S): at 21:41

## 2024-07-10 RX ADMIN — Medication 2 MILLIGRAM(S): at 09:17

## 2024-07-10 RX ADMIN — NICOTINE POLACRILEX 1 PATCH: 2 LOZENGE ORAL at 00:32

## 2024-07-10 RX ADMIN — SERTRALINE HYDROCHLORIDE 50 MILLIGRAM(S): 100 TABLET, FILM COATED ORAL at 12:11

## 2024-07-10 RX ADMIN — Medication 2 MILLIGRAM(S): at 06:04

## 2024-07-10 RX ADMIN — Medication 25 GRAM(S): at 21:41

## 2024-07-10 RX ADMIN — Medication 400 MILLIGRAM(S): at 10:39

## 2024-07-10 RX ADMIN — Medication 1 TABLET(S): at 12:12

## 2024-07-10 RX ADMIN — Medication 650 MILLIGRAM(S): at 00:32

## 2024-07-10 RX ADMIN — Medication 400 MILLIGRAM(S): at 19:03

## 2024-07-10 RX ADMIN — Medication 100 MILLIGRAM(S): at 12:12

## 2024-07-10 RX ADMIN — Medication 1 MILLIGRAM(S): at 12:12

## 2024-07-10 RX ADMIN — Medication 2 MILLIGRAM(S): at 00:32
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
## 2024-07-10 NOTE — PROGRESS NOTE ADULT - SUBJECTIVE AND OBJECTIVE BOX
Patient is a 44y old  Female who presents with a chief complaint of ETOH withdrawal (2024 17:59)      Patient seen and examined at bedside. No overnight events reported.     ALLERGIES:  Keflex (Anaphylaxis)    MEDICATIONS  (STANDING):  enoxaparin Injectable 40 milliGRAM(s) SubCutaneous every 24 hours  folic acid 1 milliGRAM(s) Oral daily  multivitamin 1 Tablet(s) Oral daily  pantoprazole    Tablet 40 milliGRAM(s) Oral before breakfast  sertraline 50 milliGRAM(s) Oral daily  sodium chloride 0.9%. 1000 milliLiter(s) (100 mL/Hr) IV Continuous <Continuous>  thiamine 100 milliGRAM(s) Oral daily    MEDICATIONS  (PRN):  LORazepam   Injectable 2 milliGRAM(s) IV Push every 1 hour PRN Symptom-triggered: each CIWA -Ar score 8 or GREATER    Vital Signs Last 24 Hrs  T(F): 98 (10 Jul 2024 05:25), Max: 99.4 (10 Jul 2024 00:25)  HR: 75 (10 Jul 2024 05:25) (75 - 104)  BP: 142/84 (10 Jul 2024 05:25) (114/63 - 160/68)  RR: 16 (10 Jul 2024 05:25) (16 - 17)  SpO2: 95% (10 Jul 2024 05:25) (95% - 99%)  I&O's Summary    2024 07:01  -  10 Jul 2024 07:00  --------------------------------------------------------  IN: 240 mL / OUT: 0 mL / NET: 240 mL      PHYSICAL EXAM:  General: NAD, A/O x 3  ENT: No gross hearing impairment, Moist mucous membranes, no thrush  Neck: Supple, No JVD  Lungs: Clear to auscultation bilaterally, good air entry, non-labored breathing  Cardio: RRR, S1/S2, No murmur  Abdomen: Soft, Nontender, Nondistended; Bowel sounds present  Extremities: No calf tenderness, No cyanosis, No pitting edema  Psych: Appropriate mood and affect    LABS:                        10.8   6.27  )-----------( 393      ( 10 Jul 2024 07:22 )             32.3         138  |  105  |  24  ----------------------------<  103  4.1   |  25  |  0.60    Ca    9.5      2024 13:48        Urinalysis Basic - ( 2024 13:48 )    Color: Yellow / Appearance: Clear / S.011 / pH: x  Gluc: 103 mg/dL / Ketone: Negative mg/dL  / Bili: Negative / Urobili: 0.2 mg/dL   Blood: x / Protein: Negative mg/dL / Nitrite: Negative   Leuk Esterase: Negative / RBC: x / WBC x   Sq Epi: x / Non Sq Epi: x / Bacteria: x        COVID-19 PCR: Alejandro (24 @ 13:48)    RADIOLOGY & ADDITIONAL TESTS:    Care Discussed with Consultants/Other Providers:    Patient is a 44y old  Female who presents with a chief complaint of ETOH withdrawal (2024 17:59)      Patient seen and examined at bedside.  Per nurse this am CIWA=17 this am.  Pt c/o HA, anxiety, tremors.     ALLERGIES:  Keflex (Anaphylaxis)    MEDICATIONS  (STANDING):  enoxaparin Injectable 40 milliGRAM(s) SubCutaneous every 24 hours  folic acid 1 milliGRAM(s) Oral daily  multivitamin 1 Tablet(s) Oral daily  pantoprazole    Tablet 40 milliGRAM(s) Oral before breakfast  sertraline 50 milliGRAM(s) Oral daily  sodium chloride 0.9%. 1000 milliLiter(s) (100 mL/Hr) IV Continuous <Continuous>  thiamine 100 milliGRAM(s) Oral daily    MEDICATIONS  (PRN):  LORazepam   Injectable 2 milliGRAM(s) IV Push every 1 hour PRN Symptom-triggered: each CIWA -Ar score 8 or GREATER    Vital Signs Last 24 Hrs  T(F): 98 (10 Jul 2024 05:25), Max: 99.4 (10 Jul 2024 00:25)  HR: 75 (10 Jul 2024 05:25) (75 - 104)  BP: 142/84 (10 Jul 2024 05:25) (114/63 - 160/68)  RR: 16 (10 Jul 2024 05:25) (16 - 17)  SpO2: 95% (10 Jul 2024 05:25) (95% - 99%)  I&O's Summary    2024 07:01  -  10 Jul 2024 07:00  --------------------------------------------------------  IN: 240 mL / OUT: 0 mL / NET: 240 mL      PHYSICAL EXAM:  General: NAD, A/O x 3  ENT: No gross hearing impairment, Moist mucous membranes, no thrush  Neck: Supple, No JVD  Lungs: Clear to auscultation bilaterally, good air entry, non-labored breathing  Cardio: RRR, S1/S2, No murmur  Abdomen: Soft, Nontender, Nondistended; Bowel sounds present  Extremities: No calf tenderness, No cyanosis, No pitting edema  Psych: Appropriate mood and affect  Neuro:  +hand tremors, speech fluent, alert x 3    LABS:                        10.8   6.27  )-----------( 393      ( 10 Jul 2024 07:22 )             32.3         138  |  105  |  24  ----------------------------<  103  4.1   |  25  |  0.60    Ca    9.5      2024 13:48        Urinalysis Basic - ( 2024 13:48 )    Color: Yellow / Appearance: Clear / S.011 / pH: x  Gluc: 103 mg/dL / Ketone: Negative mg/dL  / Bili: Negative / Urobili: 0.2 mg/dL   Blood: x / Protein: Negative mg/dL / Nitrite: Negative   Leuk Esterase: Negative / RBC: x / WBC x   Sq Epi: x / Non Sq Epi: x / Bacteria: x        COVID-19 PCR: NotDetec (24 @ 13:48)    RADIOLOGY & ADDITIONAL TESTS:    Care Discussed with Consultants/Other Providers:

## 2024-07-10 NOTE — CHART NOTE - NSCHARTNOTEFT_GEN_A_CORE
Notified by nurse that patient is reporting headache and requesting nicotine patch. Patient was evaluated at bedside AAO x 3, NAD, well-appearing, ambulating well. She states that she has a headache that began after she woke up. She denies vision changes, dizziness, chest pain, shortness of breath. Will order Tylenol 650mg for pain.    Also requesting nicotine patch. Reports that she currently smokes 1ppd, has been smoking on and off since age 14 (about 30 years). Will order 21mg Nicoderm patch.     Regular diet order placed.

## 2024-07-11 LAB
ANION GAP SERPL CALC-SCNC: 8 MMOL/L — SIGNIFICANT CHANGE UP (ref 5–17)
BUN SERPL-MCNC: 16 MG/DL — SIGNIFICANT CHANGE UP (ref 7–23)
CALCIUM SERPL-MCNC: 8.9 MG/DL — SIGNIFICANT CHANGE UP (ref 8.4–10.5)
CHLORIDE SERPL-SCNC: 103 MMOL/L — SIGNIFICANT CHANGE UP (ref 96–108)
CO2 SERPL-SCNC: 27 MMOL/L — SIGNIFICANT CHANGE UP (ref 22–31)
CREAT SERPL-MCNC: 0.57 MG/DL — SIGNIFICANT CHANGE UP (ref 0.5–1.3)
EGFR: 116 ML/MIN/1.73M2 — SIGNIFICANT CHANGE UP
GLUCOSE SERPL-MCNC: 87 MG/DL — SIGNIFICANT CHANGE UP (ref 70–99)
HCT VFR BLD CALC: 35.1 % — SIGNIFICANT CHANGE UP (ref 34.5–45)
HGB BLD-MCNC: 11.9 G/DL — SIGNIFICANT CHANGE UP (ref 11.5–15.5)
MAGNESIUM SERPL-MCNC: 1.9 MG/DL — SIGNIFICANT CHANGE UP (ref 1.6–2.6)
MCHC RBC-ENTMCNC: 30.6 PG — SIGNIFICANT CHANGE UP (ref 27–34)
MCHC RBC-ENTMCNC: 33.9 GM/DL — SIGNIFICANT CHANGE UP (ref 32–36)
MCV RBC AUTO: 90.2 FL — SIGNIFICANT CHANGE UP (ref 80–100)
NRBC # BLD: 0 /100 WBCS — SIGNIFICANT CHANGE UP (ref 0–0)
PLATELET # BLD AUTO: 428 K/UL — HIGH (ref 150–400)
POTASSIUM SERPL-MCNC: 3.9 MMOL/L — SIGNIFICANT CHANGE UP (ref 3.5–5.3)
POTASSIUM SERPL-SCNC: 3.9 MMOL/L — SIGNIFICANT CHANGE UP (ref 3.5–5.3)
RBC # BLD: 3.89 M/UL — SIGNIFICANT CHANGE UP (ref 3.8–5.2)
RBC # FLD: 14.6 % — HIGH (ref 10.3–14.5)
SODIUM SERPL-SCNC: 138 MMOL/L — SIGNIFICANT CHANGE UP (ref 135–145)
WBC # BLD: 3.61 K/UL — LOW (ref 3.8–10.5)
WBC # FLD AUTO: 3.61 K/UL — LOW (ref 3.8–10.5)

## 2024-07-11 PROCEDURE — 99233 SBSQ HOSP IP/OBS HIGH 50: CPT

## 2024-07-11 RX ORDER — NICOTINE POLACRILEX 2 MG/1
1 LOZENGE ORAL DAILY
Refills: 0 | Status: DISCONTINUED | OUTPATIENT
Start: 2024-07-11 | End: 2024-07-17

## 2024-07-11 RX ORDER — TRAZODONE HYDROCHLORIDE 50 MG/1
100 TABLET, FILM COATED ORAL AT BEDTIME
Refills: 0 | Status: DISCONTINUED | OUTPATIENT
Start: 2024-07-11 | End: 2024-07-17

## 2024-07-11 RX ADMIN — Medication 2 MILLIGRAM(S): at 05:34

## 2024-07-11 RX ADMIN — Medication 400 MILLIGRAM(S): at 05:25

## 2024-07-11 RX ADMIN — NICOTINE POLACRILEX 1 PATCH: 2 LOZENGE ORAL at 11:36

## 2024-07-11 RX ADMIN — Medication 400 MILLIGRAM(S): at 22:01

## 2024-07-11 RX ADMIN — Medication 2 MILLIGRAM(S): at 17:06

## 2024-07-11 RX ADMIN — Medication 2 MILLIGRAM(S): at 21:01

## 2024-07-11 RX ADMIN — Medication 400 MILLIGRAM(S): at 13:58

## 2024-07-11 RX ADMIN — ENOXAPARIN SODIUM 40 MILLIGRAM(S): 100 INJECTION SUBCUTANEOUS at 05:26

## 2024-07-11 RX ADMIN — NICOTINE POLACRILEX 1 PATCH: 2 LOZENGE ORAL at 19:00

## 2024-07-11 RX ADMIN — Medication 400 MILLIGRAM(S): at 21:01

## 2024-07-11 RX ADMIN — Medication 400 MILLIGRAM(S): at 14:28

## 2024-07-11 RX ADMIN — Medication 2 MILLIGRAM(S): at 13:58

## 2024-07-11 RX ADMIN — Medication 400 MILLIGRAM(S): at 06:25

## 2024-07-11 RX ADMIN — Medication 2 MILLIGRAM(S): at 09:09

## 2024-07-11 RX ADMIN — TRAZODONE HYDROCHLORIDE 100 MILLIGRAM(S): 50 TABLET, FILM COATED ORAL at 23:11

## 2024-07-11 RX ADMIN — PANTOPRAZOLE SODIUM 40 MILLIGRAM(S): 40 INJECTION, POWDER, FOR SOLUTION INTRAVENOUS at 07:02

## 2024-07-11 RX ADMIN — NICOTINE POLACRILEX 1 PATCH: 2 LOZENGE ORAL at 00:19

## 2024-07-11 NOTE — PROGRESS NOTE ADULT - SUBJECTIVE AND OBJECTIVE BOX
Patient is a 44y old  Female who presents with a chief complaint of ETOH withdrawal (10 Jul 2024 08:02)      Patient seen and examined at bedside. No overnight events reported.  She has overall body aches today and states she is very anxious.     ALLERGIES:  Keflex (Anaphylaxis)    MEDICATIONS  (STANDING):  enoxaparin Injectable 40 milliGRAM(s) SubCutaneous every 24 hours  folic acid 1 milliGRAM(s) Oral daily  multivitamin 1 Tablet(s) Oral daily  nicotine -  14 mG/24Hr(s) Patch 1 Patch Transdermal daily  pantoprazole    Tablet 40 milliGRAM(s) Oral before breakfast  sertraline 50 milliGRAM(s) Oral daily  thiamine 100 milliGRAM(s) Oral daily    MEDICATIONS  (PRN):  ibuprofen  Tablet. 400 milliGRAM(s) Oral every 6 hours PRN Temp greater or equal to 38C (100.4F), Mild Pain (1 - 3)  LORazepam   Injectable 2 milliGRAM(s) IV Push every 1 hour PRN Symptom-triggered: each CIWA -Ar score 8 or GREATER    Vital Signs Last 24 Hrs  T(F): 97.6 (11 Jul 2024 05:46), Max: 99.2 (10 Jul 2024 19:07)  HR: 61 (11 Jul 2024 05:46) (61 - 90)  BP: 152/98 (11 Jul 2024 05:46) (116/74 - 152/98)  RR: 17 (11 Jul 2024 05:46) (15 - 17)  SpO2: 98% (11 Jul 2024 05:46) (96% - 98%)  I&O's Summary    10 Jul 2024 07:01  -  11 Jul 2024 07:00  --------------------------------------------------------  IN: 1280 mL / OUT: 0 mL / NET: 1280 mL      PHYSICAL EXAM:  General: NAD, A/O x 3  ENT: No gross hearing impairment, Moist mucous membranes, no thrush  Neck: Supple, No JVD  Lungs: Clear to auscultation bilaterally, good air entry, non-labored breathing  Cardio: RRR, S1/S2, No murmur  Abdomen: Soft, Nontender, Nondistended; Bowel sounds present  Extremities: No calf tenderness, No cyanosis, No pitting edema  Psych: pt is anxious    LABS:                        11.9   3.61  )-----------( 428      ( 11 Jul 2024 07:37 )             35.1     07-10    135  |  104  |  18  ----------------------------<  115  3.6   |  24  |  0.65    Ca    8.6      10 Jul 2024 07:22  Phos  2.8     07-10  Mg     1.5     07-10    TPro  6.9  /  Alb  2.8  /  TBili  0.4  /  DBili  x   /  AST  32  /  ALT  38  /  AlkPhos  86  07-10              Urinalysis Basic - ( 10 Jul 2024 07:22 )    Color: x / Appearance: x / SG: x / pH: x  Gluc: 115 mg/dL / Ketone: x  / Bili: x / Urobili: x   Blood: x / Protein: x / Nitrite: x   Leuk Esterase: x / RBC: x / WBC x   Sq Epi: x / Non Sq Epi: x / Bacteria: x        COVID-19 PCR: NotDetec (07-09-24 @ 13:48)    RADIOLOGY & ADDITIONAL TESTS:    Care Discussed with Consultants/Other Providers:
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
## 2024-07-12 PROCEDURE — 99232 SBSQ HOSP IP/OBS MODERATE 35: CPT

## 2024-07-12 RX ADMIN — PANTOPRAZOLE SODIUM 40 MILLIGRAM(S): 40 INJECTION, POWDER, FOR SOLUTION INTRAVENOUS at 07:14

## 2024-07-12 RX ADMIN — SERTRALINE HYDROCHLORIDE 50 MILLIGRAM(S): 100 TABLET, FILM COATED ORAL at 11:27

## 2024-07-12 RX ADMIN — TRAZODONE HYDROCHLORIDE 100 MILLIGRAM(S): 50 TABLET, FILM COATED ORAL at 22:26

## 2024-07-12 RX ADMIN — Medication 100 MILLIGRAM(S): at 11:28

## 2024-07-12 RX ADMIN — Medication 400 MILLIGRAM(S): at 20:05

## 2024-07-12 RX ADMIN — NICOTINE POLACRILEX 1 PATCH: 2 LOZENGE ORAL at 07:00

## 2024-07-12 RX ADMIN — Medication 2 MILLIGRAM(S): at 11:27

## 2024-07-12 RX ADMIN — NICOTINE POLACRILEX 1 PATCH: 2 LOZENGE ORAL at 11:26

## 2024-07-12 RX ADMIN — NICOTINE POLACRILEX 1 PATCH: 2 LOZENGE ORAL at 11:00

## 2024-07-12 RX ADMIN — Medication 1 MILLIGRAM(S): at 11:27

## 2024-07-12 RX ADMIN — Medication 2 MILLIGRAM(S): at 22:22

## 2024-07-12 RX ADMIN — Medication 2 MILLIGRAM(S): at 17:39

## 2024-07-12 RX ADMIN — Medication 400 MILLIGRAM(S): at 11:27

## 2024-07-12 RX ADMIN — Medication 400 MILLIGRAM(S): at 11:57

## 2024-07-12 RX ADMIN — ENOXAPARIN SODIUM 40 MILLIGRAM(S): 100 INJECTION SUBCUTANEOUS at 07:14

## 2024-07-12 RX ADMIN — NICOTINE POLACRILEX 1 PATCH: 2 LOZENGE ORAL at 19:00

## 2024-07-12 RX ADMIN — Medication 1 TABLET(S): at 11:27

## 2024-07-12 RX ADMIN — Medication 400 MILLIGRAM(S): at 21:05

## 2024-07-12 RX ADMIN — Medication 400 MILLIGRAM(S): at 07:14
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
## 2024-07-12 NOTE — DIETITIAN INITIAL EVALUATION ADULT - PERTINENT LABORATORY DATA
07-11    138  |  103  |  16  ----------------------------<  87  3.9   |  27  |  0.57    Ca    8.9      11 Jul 2024 07:37  Mg     1.9     07-11

## 2024-07-12 NOTE — DIETITIAN INITIAL EVALUATION ADULT - ADD RECOMMEND
1. Recommend continue current nutrition plan of care as tolerated   2. Monitor daily PO intakes, GI tolerance, labs, weights, skin integrity, & BM regularity

## 2024-07-12 NOTE — DIETITIAN INITIAL EVALUATION ADULT - PERTINENT MEDS FT
MEDICATIONS  (STANDING):  enoxaparin Injectable 40 milliGRAM(s) SubCutaneous every 24 hours  folic acid 1 milliGRAM(s) Oral daily  multivitamin 1 Tablet(s) Oral daily  nicotine -  14 mG/24Hr(s) Patch 1 Patch Transdermal daily  pantoprazole    Tablet 40 milliGRAM(s) Oral before breakfast  sertraline 50 milliGRAM(s) Oral daily  thiamine 100 milliGRAM(s) Oral daily  traZODone 100 milliGRAM(s) Oral at bedtime    MEDICATIONS  (PRN):  ibuprofen  Tablet. 400 milliGRAM(s) Oral every 6 hours PRN Temp greater or equal to 38C (100.4F), Mild Pain (1 - 3)  LORazepam   Injectable 2 milliGRAM(s) IV Push every 1 hour PRN Symptom-triggered: each CIWA -Ar score 8 or GREATER

## 2024-07-12 NOTE — DIETITIAN INITIAL EVALUATION ADULT - OTHER INFO
Patient reports improved appetite, consuming >75% of meals at this time per nursing flowsheets. Denies any chewing/swallowing difficulties. Continues with thiamine, folic acid, and MVI supplements 2/2 ETOH abuse. Food preferences obtained and noted on patients file with nutrition office. Electrolytes stable at this time.

## 2024-07-12 NOTE — DIETITIAN INITIAL EVALUATION ADULT - ORAL INTAKE PTA/DIET HISTORY
Patient endorses decreased appetite prior to admission. Admits to excessive ETOH intake. Denies any food allergies/intolerances.

## 2024-07-12 NOTE — PROGRESS NOTE ADULT - SUBJECTIVE AND OBJECTIVE BOX
Patient is a 44y old  Female who presents with a chief complaint of ETOH withdrawal (11 Jul 2024 09:05)      Patient seen and examined at bedside. No overnight events reported.     ALLERGIES:  Keflex (Anaphylaxis)    MEDICATIONS  (STANDING):  enoxaparin Injectable 40 milliGRAM(s) SubCutaneous every 24 hours  folic acid 1 milliGRAM(s) Oral daily  multivitamin 1 Tablet(s) Oral daily  nicotine -  14 mG/24Hr(s) Patch 1 Patch Transdermal daily  pantoprazole    Tablet 40 milliGRAM(s) Oral before breakfast  sertraline 50 milliGRAM(s) Oral daily  thiamine 100 milliGRAM(s) Oral daily  traZODone 100 milliGRAM(s) Oral at bedtime    MEDICATIONS  (PRN):  ibuprofen  Tablet. 400 milliGRAM(s) Oral every 6 hours PRN Temp greater or equal to 38C (100.4F), Mild Pain (1 - 3)  LORazepam   Injectable 2 milliGRAM(s) IV Push every 1 hour PRN Symptom-triggered: each CIWA -Ar score 8 or GREATER    Vital Signs Last 24 Hrs  T(F): 97.4 (12 Jul 2024 05:27), Max: 97.6 (11 Jul 2024 12:10)  HR: 80 (12 Jul 2024 05:27) (80 - 97)  BP: 101/68 (12 Jul 2024 05:27) (101/68 - 120/80)  RR: 16 (12 Jul 2024 05:27) (16 - 16)  SpO2: 99% (12 Jul 2024 05:27) (98% - 99%)  I&O's Summary    PHYSICAL EXAM:  General: NAD, A/O x 3  ENT: No gross hearing impairment, Moist mucous membranes, no thrush  Neck: Supple, No JVD  Lungs: Clear to auscultation bilaterally, good air entry, non-labored breathing  Cardio: RRR, S1/S2, No murmur  Abdomen: Soft, Nontender, Nondistended; Bowel sounds present  Extremities: No calf tenderness, No cyanosis, No pitting edema  Psych: Appropriate mood and affect    LABS:                        11.9   3.61  )-----------( 428      ( 11 Jul 2024 07:37 )             35.1     07-11    138  |  103  |  16  ----------------------------<  87  3.9   |  27  |  0.57    Ca    8.9      11 Jul 2024 07:37  Phos  2.8     07-10  Mg     1.9     07-11    TPro  6.9  /  Alb  2.8  /  TBili  0.4  /  DBili  x   /  AST  32  /  ALT  38  /  AlkPhos  86  07-10                Urinalysis Basic - ( 11 Jul 2024 07:37 )    Color: x / Appearance: x / SG: x / pH: x  Gluc: 87 mg/dL / Ketone: x  / Bili: x / Urobili: x   Blood: x / Protein: x / Nitrite: x   Leuk Esterase: x / RBC: x / WBC x   Sq Epi: x / Non Sq Epi: x / Bacteria: x        COVID-19 PCR: Alejandro (07-09-24 @ 13:48)    RADIOLOGY & ADDITIONAL TESTS:    Care Discussed with Consultants/Other Providers:    Patient is a 44y old  Female who presents with a chief complaint of ETOH withdrawal (11 Jul 2024 09:05)      Patient seen and examined at bedside. No overnight events reported.  Pt c/o anxiety and tremors.     ALLERGIES:  Keflex (Anaphylaxis)    MEDICATIONS  (STANDING):  enoxaparin Injectable 40 milliGRAM(s) SubCutaneous every 24 hours  folic acid 1 milliGRAM(s) Oral daily  multivitamin 1 Tablet(s) Oral daily  nicotine -  14 mG/24Hr(s) Patch 1 Patch Transdermal daily  pantoprazole    Tablet 40 milliGRAM(s) Oral before breakfast  sertraline 50 milliGRAM(s) Oral daily  thiamine 100 milliGRAM(s) Oral daily  traZODone 100 milliGRAM(s) Oral at bedtime    MEDICATIONS  (PRN):  ibuprofen  Tablet. 400 milliGRAM(s) Oral every 6 hours PRN Temp greater or equal to 38C (100.4F), Mild Pain (1 - 3)  LORazepam   Injectable 2 milliGRAM(s) IV Push every 1 hour PRN Symptom-triggered: each CIWA -Ar score 8 or GREATER    Vital Signs Last 24 Hrs  T(F): 97.4 (12 Jul 2024 05:27), Max: 97.6 (11 Jul 2024 12:10)  HR: 80 (12 Jul 2024 05:27) (80 - 97)  BP: 101/68 (12 Jul 2024 05:27) (101/68 - 120/80)  RR: 16 (12 Jul 2024 05:27) (16 - 16)  SpO2: 99% (12 Jul 2024 05:27) (98% - 99%)  I&O's Summary    PHYSICAL EXAM:  General: NAD, A/O x 3  ENT: No gross hearing impairment, Moist mucous membranes, no thrush  Neck: Supple, No JVD  Lungs: Clear to auscultation bilaterally, good air entry, non-labored breathing  Cardio: RRR, S1/S2, No murmur  Abdomen: Soft, Nontender, Nondistended; Bowel sounds present  Extremities: No calf tenderness, No cyanosis, No pitting edema  Psych: mood anxious    LABS:                        11.9   3.61  )-----------( 428      ( 11 Jul 2024 07:37 )             35.1     07-11    138  |  103  |  16  ----------------------------<  87  3.9   |  27  |  0.57    Ca    8.9      11 Jul 2024 07:37  Phos  2.8     07-10  Mg     1.9     07-11    TPro  6.9  /  Alb  2.8  /  TBili  0.4  /  DBili  x   /  AST  32  /  ALT  38  /  AlkPhos  86  07-10                Urinalysis Basic - ( 11 Jul 2024 07:37 )    Color: x / Appearance: x / SG: x / pH: x  Gluc: 87 mg/dL / Ketone: x  / Bili: x / Urobili: x   Blood: x / Protein: x / Nitrite: x   Leuk Esterase: x / RBC: x / WBC x   Sq Epi: x / Non Sq Epi: x / Bacteria: x        COVID-19 PCR: Alejandro (07-09-24 @ 13:48)    RADIOLOGY & ADDITIONAL TESTS:    Care Discussed with Consultants/Other Providers:

## 2024-07-12 NOTE — DIETITIAN INITIAL EVALUATION ADULT - HEIGHT FOR BMI (FEET)
Patient went to fill a new prescription dapsone, but the pharmacy no longer covers it. Please call patient to discuss.    5

## 2024-07-13 LAB
ANION GAP SERPL CALC-SCNC: 7 MMOL/L — SIGNIFICANT CHANGE UP (ref 5–17)
BUN SERPL-MCNC: 30 MG/DL — HIGH (ref 7–23)
CALCIUM SERPL-MCNC: 9 MG/DL — SIGNIFICANT CHANGE UP (ref 8.4–10.5)
CHLORIDE SERPL-SCNC: 104 MMOL/L — SIGNIFICANT CHANGE UP (ref 96–108)
CO2 SERPL-SCNC: 25 MMOL/L — SIGNIFICANT CHANGE UP (ref 22–31)
CREAT SERPL-MCNC: 0.63 MG/DL — SIGNIFICANT CHANGE UP (ref 0.5–1.3)
EGFR: 113 ML/MIN/1.73M2 — SIGNIFICANT CHANGE UP
GLUCOSE SERPL-MCNC: 115 MG/DL — HIGH (ref 70–99)
HCT VFR BLD CALC: 34.8 % — SIGNIFICANT CHANGE UP (ref 34.5–45)
HGB BLD-MCNC: 11.3 G/DL — LOW (ref 11.5–15.5)
MAGNESIUM SERPL-MCNC: 1.4 MG/DL — LOW (ref 1.6–2.6)
MCHC RBC-ENTMCNC: 30.5 PG — SIGNIFICANT CHANGE UP (ref 27–34)
MCHC RBC-ENTMCNC: 32.5 GM/DL — SIGNIFICANT CHANGE UP (ref 32–36)
MCV RBC AUTO: 93.8 FL — SIGNIFICANT CHANGE UP (ref 80–100)
NRBC # BLD: 0 /100 WBCS — SIGNIFICANT CHANGE UP (ref 0–0)
PLATELET # BLD AUTO: 378 K/UL — SIGNIFICANT CHANGE UP (ref 150–400)
POTASSIUM SERPL-MCNC: 4.7 MMOL/L — SIGNIFICANT CHANGE UP (ref 3.5–5.3)
POTASSIUM SERPL-SCNC: 4.7 MMOL/L — SIGNIFICANT CHANGE UP (ref 3.5–5.3)
RBC # BLD: 3.71 M/UL — LOW (ref 3.8–5.2)
RBC # FLD: 14.6 % — HIGH (ref 10.3–14.5)
SODIUM SERPL-SCNC: 136 MMOL/L — SIGNIFICANT CHANGE UP (ref 135–145)
WBC # BLD: 7.33 K/UL — SIGNIFICANT CHANGE UP (ref 3.8–10.5)
WBC # FLD AUTO: 7.33 K/UL — SIGNIFICANT CHANGE UP (ref 3.8–10.5)

## 2024-07-13 PROCEDURE — 99232 SBSQ HOSP IP/OBS MODERATE 35: CPT

## 2024-07-13 RX ORDER — MAGNESIUM SULFATE 100 %
2 POWDER (GRAM) MISCELLANEOUS ONCE
Refills: 0 | Status: COMPLETED | OUTPATIENT
Start: 2024-07-13 | End: 2024-07-13

## 2024-07-13 RX ADMIN — Medication 400 MILLIGRAM(S): at 21:52

## 2024-07-13 RX ADMIN — Medication 2 MILLIGRAM(S): at 08:14

## 2024-07-13 RX ADMIN — Medication 400 MILLIGRAM(S): at 22:22

## 2024-07-13 RX ADMIN — Medication 100 MILLIGRAM(S): at 11:58

## 2024-07-13 RX ADMIN — PANTOPRAZOLE SODIUM 40 MILLIGRAM(S): 40 INJECTION, POWDER, FOR SOLUTION INTRAVENOUS at 06:49

## 2024-07-13 RX ADMIN — Medication 1 MILLIGRAM(S): at 11:58

## 2024-07-13 RX ADMIN — Medication 2 MILLIGRAM(S): at 14:44

## 2024-07-13 RX ADMIN — NICOTINE POLACRILEX 1 PATCH: 2 LOZENGE ORAL at 11:58

## 2024-07-13 RX ADMIN — Medication 2 MILLIGRAM(S): at 21:52

## 2024-07-13 RX ADMIN — NICOTINE POLACRILEX 1 PATCH: 2 LOZENGE ORAL at 07:24

## 2024-07-13 RX ADMIN — NICOTINE POLACRILEX 1 PATCH: 2 LOZENGE ORAL at 11:55

## 2024-07-13 RX ADMIN — NICOTINE POLACRILEX 1 PATCH: 2 LOZENGE ORAL at 18:50

## 2024-07-13 RX ADMIN — Medication 1 TABLET(S): at 11:58

## 2024-07-13 RX ADMIN — TRAZODONE HYDROCHLORIDE 100 MILLIGRAM(S): 50 TABLET, FILM COATED ORAL at 21:52

## 2024-07-13 RX ADMIN — SERTRALINE HYDROCHLORIDE 50 MILLIGRAM(S): 100 TABLET, FILM COATED ORAL at 11:59

## 2024-07-13 RX ADMIN — Medication 25 GRAM(S): at 13:47

## 2024-07-13 RX ADMIN — ENOXAPARIN SODIUM 40 MILLIGRAM(S): 100 INJECTION SUBCUTANEOUS at 06:49
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
## 2024-07-13 NOTE — PROGRESS NOTE ADULT - SUBJECTIVE AND OBJECTIVE BOX
Patient is a 44y old  Female who presents with a chief complaint of Alcohol use with intoxication    Patient seen and examined at bedside.  no acute overnight events    ALLERGIES:  Keflex (Anaphylaxis)        Vital Signs Last 24 Hrs  T(F): 98 (13 Jul 2024 06:06), Max: 98.7 (12 Jul 2024 19:53)  HR: 87 (13 Jul 2024 06:06) (84 - 97)  BP: 96/62 (13 Jul 2024 06:06) (96/62 - 108/69)  RR: 16 (13 Jul 2024 06:06) (16 - 16)  SpO2: 96% (13 Jul 2024 06:06) (96% - 98%)  I&O's Summary    MEDICATIONS:  enoxaparin Injectable 40 milliGRAM(s) SubCutaneous every 24 hours  folic acid 1 milliGRAM(s) Oral daily  ibuprofen  Tablet. 400 milliGRAM(s) Oral every 6 hours PRN  LORazepam   Injectable 2 milliGRAM(s) IV Push every 1 hour PRN  magnesium sulfate  IVPB 2 Gram(s) IV Intermittent once  multivitamin 1 Tablet(s) Oral daily  nicotine -  14 mG/24Hr(s) Patch 1 Patch Transdermal daily  pantoprazole    Tablet 40 milliGRAM(s) Oral before breakfast  sertraline 50 milliGRAM(s) Oral daily  thiamine 100 milliGRAM(s) Oral daily  traZODone 100 milliGRAM(s) Oral at bedtime      PHYSICAL EXAM:  General: NAD, A/O x 3  ENT: MMM, no thrush  Neck: Supple, No JVD  Lungs: Clear to auscultation bilaterally, non labored, good air entry  Cardio: RRR, S1/S2, No murmurs  Abdomen: Soft, Nontender, Nondistended; Bowel sounds present  Extremities: No cyanosis, No edema    LABS:                        11.3   7.33  )-----------( 378      ( 13 Jul 2024 09:42 )             34.8     07-13    136  |  104  |  30  ----------------------------<  115  4.7   |  25  |  0.63    Ca    9.0      13 Jul 2024 09:42  Mg     1.4     07-13                                  Urinalysis Basic - ( 13 Jul 2024 09:42 )    Color: x / Appearance: x / SG: x / pH: x  Gluc: 115 mg/dL / Ketone: x  / Bili: x / Urobili: x   Blood: x / Protein: x / Nitrite: x   Leuk Esterase: x / RBC: x / WBC x   Sq Epi: x / Non Sq Epi: x / Bacteria: x        COVID-19 PCR: NotDetec (07-09-24 @ 13:48)      RADIOLOGY & ADDITIONAL TESTS:    Care Discussed with Consultants/Other Providers:    Patient is a 44y old  Female who presents with a chief complaint of Alcohol use with intoxication    Patient seen and examined at bedside.  no acute overnight events    ALLERGIES:  Keflex (Anaphylaxis)    Vital Signs Last 24 Hrs  T(F): 98 (13 Jul 2024 06:06), Max: 98.7 (12 Jul 2024 19:53)  HR: 87 (13 Jul 2024 06:06) (84 - 97)  BP: 96/62 (13 Jul 2024 06:06) (96/62 - 108/69)  RR: 16 (13 Jul 2024 06:06) (16 - 16)  SpO2: 96% (13 Jul 2024 06:06) (96% - 98%)  I&O's Summary    MEDICATIONS:  enoxaparin Injectable 40 milliGRAM(s) SubCutaneous every 24 hours  folic acid 1 milliGRAM(s) Oral daily  ibuprofen  Tablet. 400 milliGRAM(s) Oral every 6 hours PRN  LORazepam   Injectable 2 milliGRAM(s) IV Push every 1 hour PRN  magnesium sulfate  IVPB 2 Gram(s) IV Intermittent once  multivitamin 1 Tablet(s) Oral daily  nicotine -  14 mG/24Hr(s) Patch 1 Patch Transdermal daily  pantoprazole    Tablet 40 milliGRAM(s) Oral before breakfast  sertraline 50 milliGRAM(s) Oral daily  thiamine 100 milliGRAM(s) Oral daily  traZODone 100 milliGRAM(s) Oral at bedtime      PHYSICAL EXAM:  General: NAD, A/O x 3  ENT: MMM, no thrush  Neck: Supple, No JVD  Lungs: Clear to auscultation bilaterally, non labored, good air entry  Cardio: RRR, S1/S2, No murmurs  Abdomen: Soft, Nontender, Nondistended; Bowel sounds present  Extremities: No cyanosis, No edema    LABS:                        11.3   7.33  )-----------( 378      ( 13 Jul 2024 09:42 )             34.8     07-13    136  |  104  |  30  ----------------------------<  115  4.7   |  25  |  0.63    Ca    9.0      13 Jul 2024 09:42  Mg     1.4     07-13            Urinalysis Basic - ( 13 Jul 2024 09:42 )    Color: x / Appearance: x / SG: x / pH: x  Gluc: 115 mg/dL / Ketone: x  / Bili: x / Urobili: x   Blood: x / Protein: x / Nitrite: x   Leuk Esterase: x / RBC: x / WBC x   Sq Epi: x / Non Sq Epi: x / Bacteria: x        COVID-19 PCR: NotDetec (07-09-24 @ 13:48)      RADIOLOGY & ADDITIONAL TESTS:    Care Discussed with Consultants/Other Providers:

## 2024-07-14 LAB
ANION GAP SERPL CALC-SCNC: 6 MMOL/L — SIGNIFICANT CHANGE UP (ref 5–17)
BUN SERPL-MCNC: 24 MG/DL — HIGH (ref 7–23)
CALCIUM SERPL-MCNC: 9.3 MG/DL — SIGNIFICANT CHANGE UP (ref 8.4–10.5)
CHLORIDE SERPL-SCNC: 105 MMOL/L — SIGNIFICANT CHANGE UP (ref 96–108)
CO2 SERPL-SCNC: 27 MMOL/L — SIGNIFICANT CHANGE UP (ref 22–31)
CREAT SERPL-MCNC: 0.48 MG/DL — LOW (ref 0.5–1.3)
EGFR: 120 ML/MIN/1.73M2 — SIGNIFICANT CHANGE UP
GLUCOSE SERPL-MCNC: 106 MG/DL — HIGH (ref 70–99)
HCT VFR BLD CALC: 34.4 % — LOW (ref 34.5–45)
HGB BLD-MCNC: 10.9 G/DL — LOW (ref 11.5–15.5)
MAGNESIUM SERPL-MCNC: 1.7 MG/DL — SIGNIFICANT CHANGE UP (ref 1.6–2.6)
MCHC RBC-ENTMCNC: 29.9 PG — SIGNIFICANT CHANGE UP (ref 27–34)
MCHC RBC-ENTMCNC: 31.7 GM/DL — LOW (ref 32–36)
MCV RBC AUTO: 94.5 FL — SIGNIFICANT CHANGE UP (ref 80–100)
NRBC # BLD: 0 /100 WBCS — SIGNIFICANT CHANGE UP (ref 0–0)
PLATELET # BLD AUTO: 379 K/UL — SIGNIFICANT CHANGE UP (ref 150–400)
POTASSIUM SERPL-MCNC: 4.4 MMOL/L — SIGNIFICANT CHANGE UP (ref 3.5–5.3)
POTASSIUM SERPL-SCNC: 4.4 MMOL/L — SIGNIFICANT CHANGE UP (ref 3.5–5.3)
RBC # BLD: 3.64 M/UL — LOW (ref 3.8–5.2)
RBC # FLD: 14.4 % — SIGNIFICANT CHANGE UP (ref 10.3–14.5)
SODIUM SERPL-SCNC: 138 MMOL/L — SIGNIFICANT CHANGE UP (ref 135–145)
WBC # BLD: 5.64 K/UL — SIGNIFICANT CHANGE UP (ref 3.8–10.5)
WBC # FLD AUTO: 5.64 K/UL — SIGNIFICANT CHANGE UP (ref 3.8–10.5)

## 2024-07-14 PROCEDURE — 99232 SBSQ HOSP IP/OBS MODERATE 35: CPT

## 2024-07-14 RX ORDER — MAGNESIUM SULFATE 100 %
1 POWDER (GRAM) MISCELLANEOUS ONCE
Refills: 0 | Status: COMPLETED | OUTPATIENT
Start: 2024-07-14 | End: 2024-07-14

## 2024-07-14 RX ADMIN — Medication 400 MILLIGRAM(S): at 14:06

## 2024-07-14 RX ADMIN — PANTOPRAZOLE SODIUM 40 MILLIGRAM(S): 40 INJECTION, POWDER, FOR SOLUTION INTRAVENOUS at 05:39

## 2024-07-14 RX ADMIN — Medication 400 MILLIGRAM(S): at 21:43

## 2024-07-14 RX ADMIN — Medication 400 MILLIGRAM(S): at 09:30

## 2024-07-14 RX ADMIN — Medication 400 MILLIGRAM(S): at 08:30

## 2024-07-14 RX ADMIN — ENOXAPARIN SODIUM 40 MILLIGRAM(S): 100 INJECTION SUBCUTANEOUS at 05:39

## 2024-07-14 RX ADMIN — NICOTINE POLACRILEX 1 PATCH: 2 LOZENGE ORAL at 11:00

## 2024-07-14 RX ADMIN — NICOTINE POLACRILEX 1 PATCH: 2 LOZENGE ORAL at 07:22

## 2024-07-14 RX ADMIN — NICOTINE POLACRILEX 1 PATCH: 2 LOZENGE ORAL at 19:26

## 2024-07-14 RX ADMIN — Medication 100 GRAM(S): at 11:49

## 2024-07-14 RX ADMIN — NICOTINE POLACRILEX 1 PATCH: 2 LOZENGE ORAL at 11:34

## 2024-07-14 RX ADMIN — SERTRALINE HYDROCHLORIDE 50 MILLIGRAM(S): 100 TABLET, FILM COATED ORAL at 11:34

## 2024-07-14 RX ADMIN — Medication 400 MILLIGRAM(S): at 21:13

## 2024-07-14 RX ADMIN — Medication 2 MILLIGRAM(S): at 08:29

## 2024-07-14 RX ADMIN — Medication 1 MILLIGRAM(S): at 11:34

## 2024-07-14 RX ADMIN — TRAZODONE HYDROCHLORIDE 100 MILLIGRAM(S): 50 TABLET, FILM COATED ORAL at 21:13

## 2024-07-14 RX ADMIN — Medication 400 MILLIGRAM(S): at 15:00

## 2024-07-14 RX ADMIN — Medication 2 MILLIGRAM(S): at 14:06

## 2024-07-14 RX ADMIN — Medication 2 MILLIGRAM(S): at 19:48

## 2024-07-14 RX ADMIN — Medication 1 TABLET(S): at 11:34

## 2024-07-14 RX ADMIN — Medication 100 MILLIGRAM(S): at 11:34

## 2024-07-14 NOTE — PROGRESS NOTE ADULT - SUBJECTIVE AND OBJECTIVE BOX
Patient is a 44y old  Female who presents with a chief complaint of ETOH withdrawal (13 Jul 2024 10:14)    Patient seen and examined at bedside.  no acute overnight events    ALLERGIES:  Keflex (Anaphylaxis)        Vital Signs Last 24 Hrs  T(F): 97.7 (14 Jul 2024 05:58), Max: 97.9 (13 Jul 2024 12:31)  HR: 79 (14 Jul 2024 05:58) (56 - 92)  BP: 103/66 (14 Jul 2024 05:58) (99/66 - 127/71)  RR: 18 (14 Jul 2024 05:58) (16 - 18)  SpO2: 97% (14 Jul 2024 05:58) (97% - 98%)  I&O's Summary    13 Jul 2024 07:01  -  14 Jul 2024 07:00  --------------------------------------------------------  IN: 50 mL / OUT: 0 mL / NET: 50 mL      MEDICATIONS:  enoxaparin Injectable 40 milliGRAM(s) SubCutaneous every 24 hours  folic acid 1 milliGRAM(s) Oral daily  ibuprofen  Tablet. 400 milliGRAM(s) Oral every 6 hours PRN  LORazepam   Injectable 2 milliGRAM(s) IV Push every 1 hour PRN  magnesium sulfate  IVPB 1 Gram(s) IV Intermittent once  multivitamin 1 Tablet(s) Oral daily  nicotine -  14 mG/24Hr(s) Patch 1 Patch Transdermal daily  pantoprazole    Tablet 40 milliGRAM(s) Oral before breakfast  sertraline 50 milliGRAM(s) Oral daily  thiamine 100 milliGRAM(s) Oral daily  traZODone 100 milliGRAM(s) Oral at bedtime      PHYSICAL EXAM:  General: NAD, A/O x 3  ENT: MMM, no thrush  Neck: Supple, No JVD  Lungs: Clear to auscultation bilaterally, non labored, good air entry  Cardio: RRR, S1/S2, No murmurs  Abdomen: Soft, Nontender, Nondistended; Bowel sounds present  Extremities: No cyanosis, No edema    LABS:                        10.9   5.64  )-----------( 379      ( 14 Jul 2024 06:00 )             34.4     07-14    138  |  105  |  24  ----------------------------<  106  4.4   |  27  |  0.48    Ca    9.3      14 Jul 2024 05:44  Mg     1.7     07-14                                  Urinalysis Basic - ( 14 Jul 2024 05:44 )    Color: x / Appearance: x / SG: x / pH: x  Gluc: 106 mg/dL / Ketone: x  / Bili: x / Urobili: x   Blood: x / Protein: x / Nitrite: x   Leuk Esterase: x / RBC: x / WBC x   Sq Epi: x / Non Sq Epi: x / Bacteria: x        COVID-19 PCR: Alejandro (07-09-24 @ 13:48)      RADIOLOGY & ADDITIONAL TESTS:    Care Discussed with Consultants/Other Providers:

## 2024-07-14 NOTE — DISCHARGE NOTE PROVIDER - NSDCMRMEDTOKEN_GEN_ALL_CORE_FT
omeprazole 40 mg oral delayed release capsule: 1 cap(s) orally once a day  sertraline 50 mg oral tablet: 1 tab(s) orally once a day  traZODone 100 mg oral tablet: 1 tab(s) orally once a day (at bedtime)   escitalopram 5 mg oral tablet: 1 tab(s) orally once a day  folic acid 1 mg oral tablet: 1 tab(s) orally once a day  ibuprofen 400 mg oral tablet: 1 tab(s) orally every 6 hours As needed Temp greater or equal to 38C (100.4F), Mild Pain (1 - 3)  Multiple Vitamins oral tablet: 1 tab(s) orally once a day  nicotine 14 mg/24 hr transdermal film, extended release: 1 patch transdermal once a day  omeprazole 40 mg oral delayed release capsule: 1 cap(s) orally once a day  thiamine 100 mg oral tablet: 1 tab(s) orally once a day  traZODone 100 mg oral tablet: 1 tab(s) orally once a day (at bedtime)

## 2024-07-14 NOTE — DISCHARGE NOTE PROVIDER - CARE PROVIDER_API CALL
Janice Lozano  Psychiatry  [address] [place] [address]  Phone: [phone]  Fax: [phone]  Follow Up Time:

## 2024-07-14 NOTE — DISCHARGE NOTE PROVIDER - NSDCCPCAREPLAN_GEN_ALL_CORE_FT
PRINCIPAL DISCHARGE DIAGNOSIS  Diagnosis: Alcohol intoxication  Assessment and Plan of Treatment: You were admitted for alcohol withdrawal  You were treated with symptom triggered Ativan  You were evaluated by psychatry and are switched from sertraline to lexapro  You were accepted to inpatient rehab  Follow up with primary care provider     PRINCIPAL DISCHARGE DIAGNOSIS  Diagnosis: Alcohol intoxication  Assessment and Plan of Treatment: You were admitted for alcohol withdrawal  You were treated with symptom triggered Ativan  You were evaluated by psychiatry and are switched from sertraline to lexapro  You were accepted to inpatient rehab  Follow up with primary care provider

## 2024-07-14 NOTE — DISCHARGE NOTE PROVIDER - NSDCADMDATE_GEN_ALL_CORE_FT
09-Jul-2024 17:21 patient BIBEMS from [facility] c/o nosebleed x72 hours. bleeding controlled with gauze in triage. on eliquis. AV fistula noted to right upper arm. as per EMS, blood was leaking out of AV fistula PTA. fistula wrapped in gauze. patient on 3L NC at baseline, hx respiratory failure.
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
## 2024-07-15 DIAGNOSIS — F10.20 ALCOHOL DEPENDENCE, UNCOMPLICATED: ICD-10-CM

## 2024-07-15 DIAGNOSIS — F43.21 ADJUSTMENT DISORDER WITH DEPRESSED MOOD: ICD-10-CM

## 2024-07-15 DIAGNOSIS — F43.10 POST-TRAUMATIC STRESS DISORDER, UNSPECIFIED: ICD-10-CM

## 2024-07-15 DIAGNOSIS — F19.94 OTHER PSYCHOACTIVE SUBSTANCE USE, UNSPECIFIED WITH PSYCHOACTIVE SUBSTANCE-INDUCED MOOD DISORDER: ICD-10-CM

## 2024-07-15 LAB
ANION GAP SERPL CALC-SCNC: 10 MMOL/L — SIGNIFICANT CHANGE UP (ref 5–17)
BUN SERPL-MCNC: 23 MG/DL — SIGNIFICANT CHANGE UP (ref 7–23)
CALCIUM SERPL-MCNC: 9.4 MG/DL — SIGNIFICANT CHANGE UP (ref 8.4–10.5)
CHLORIDE SERPL-SCNC: 105 MMOL/L — SIGNIFICANT CHANGE UP (ref 96–108)
CO2 SERPL-SCNC: 24 MMOL/L — SIGNIFICANT CHANGE UP (ref 22–31)
CREAT SERPL-MCNC: 0.67 MG/DL — SIGNIFICANT CHANGE UP (ref 0.5–1.3)
EGFR: 111 ML/MIN/1.73M2 — SIGNIFICANT CHANGE UP
GLUCOSE SERPL-MCNC: 84 MG/DL — SIGNIFICANT CHANGE UP (ref 70–99)
HCT VFR BLD CALC: 34 % — LOW (ref 34.5–45)
HGB BLD-MCNC: 11 G/DL — LOW (ref 11.5–15.5)
MAGNESIUM SERPL-MCNC: 1.6 MG/DL — SIGNIFICANT CHANGE UP (ref 1.6–2.6)
MCHC RBC-ENTMCNC: 30.5 PG — SIGNIFICANT CHANGE UP (ref 27–34)
MCHC RBC-ENTMCNC: 32.4 GM/DL — SIGNIFICANT CHANGE UP (ref 32–36)
MCV RBC AUTO: 94.2 FL — SIGNIFICANT CHANGE UP (ref 80–100)
NRBC # BLD: 0 /100 WBCS — SIGNIFICANT CHANGE UP (ref 0–0)
PLATELET # BLD AUTO: 346 K/UL — SIGNIFICANT CHANGE UP (ref 150–400)
POTASSIUM SERPL-MCNC: 4.6 MMOL/L — SIGNIFICANT CHANGE UP (ref 3.5–5.3)
POTASSIUM SERPL-SCNC: 4.6 MMOL/L — SIGNIFICANT CHANGE UP (ref 3.5–5.3)
RBC # BLD: 3.61 M/UL — LOW (ref 3.8–5.2)
RBC # FLD: 14.2 % — SIGNIFICANT CHANGE UP (ref 10.3–14.5)
SODIUM SERPL-SCNC: 139 MMOL/L — SIGNIFICANT CHANGE UP (ref 135–145)
WBC # BLD: 6.51 K/UL — SIGNIFICANT CHANGE UP (ref 3.8–10.5)
WBC # FLD AUTO: 6.51 K/UL — SIGNIFICANT CHANGE UP (ref 3.8–10.5)

## 2024-07-15 PROCEDURE — 90792 PSYCH DIAG EVAL W/MED SRVCS: CPT

## 2024-07-15 PROCEDURE — 99232 SBSQ HOSP IP/OBS MODERATE 35: CPT

## 2024-07-15 RX ADMIN — PANTOPRAZOLE SODIUM 40 MILLIGRAM(S): 40 INJECTION, POWDER, FOR SOLUTION INTRAVENOUS at 06:12

## 2024-07-15 RX ADMIN — Medication 1 MILLIGRAM(S): at 14:05

## 2024-07-15 RX ADMIN — Medication 2 MILLIGRAM(S): at 19:33

## 2024-07-15 RX ADMIN — Medication 400 MILLIGRAM(S): at 20:03

## 2024-07-15 RX ADMIN — SERTRALINE HYDROCHLORIDE 50 MILLIGRAM(S): 100 TABLET, FILM COATED ORAL at 14:05

## 2024-07-15 RX ADMIN — NICOTINE POLACRILEX 1 PATCH: 2 LOZENGE ORAL at 11:00

## 2024-07-15 RX ADMIN — Medication 100 MILLIGRAM(S): at 14:05

## 2024-07-15 RX ADMIN — ENOXAPARIN SODIUM 40 MILLIGRAM(S): 100 INJECTION SUBCUTANEOUS at 06:12

## 2024-07-15 RX ADMIN — NICOTINE POLACRILEX 1 PATCH: 2 LOZENGE ORAL at 12:20

## 2024-07-15 RX ADMIN — Medication 400 MILLIGRAM(S): at 19:33

## 2024-07-15 RX ADMIN — Medication 1 TABLET(S): at 14:05

## 2024-07-15 RX ADMIN — NICOTINE POLACRILEX 1 PATCH: 2 LOZENGE ORAL at 19:34

## 2024-07-15 RX ADMIN — NICOTINE POLACRILEX 1 PATCH: 2 LOZENGE ORAL at 07:00

## 2024-07-15 RX ADMIN — TRAZODONE HYDROCHLORIDE 100 MILLIGRAM(S): 50 TABLET, FILM COATED ORAL at 21:16

## 2024-07-15 RX ADMIN — Medication 2 MILLIGRAM(S): at 12:20
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
## 2024-07-15 NOTE — BH CONSULTATION LIAISON ASSESSMENT NOTE - NSBHCONSULTRECOMMENDOTHER_PSY_A_CORE FT
- Continue with Ativan 2mg IV Push CIWA-Ar.  - Recommend switching from Sertraline to Lexapro. Recommend lowering dose to Sertraline 25mg tomorrow (07/16/24) and starting patient on Lexapro 5mg on 07/17/24.   - Continue with Trazodone 100mg qhs.  - Continue with Nicotine Patch (14mg/24hrs).  - Patient reports Naltrexone has been ineffective in the past. Patient can explore Acamprosate with inpatient substance use rehab for ongoing alcohol cravings/urges.  - Patient would like to seek inpatient substance use rehab. Connect with social work to find institutions that are in-network with patient's insurance.
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
## 2024-07-15 NOTE — BH CONSULTATION LIAISON ASSESSMENT NOTE - NSUNABLEASSESSPROTRISKCOMMENT_PSY_ALL_CORE
Patient is future oriented and has a strong desire to achieve abstinent and improve her current living circumstances.
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
## 2024-07-15 NOTE — BH CONSULTATION LIAISON ASSESSMENT NOTE - NSBHCHARTREVIEWINVESTIGATE_PSY_A_CORE FT
< from: 12 Lead ECG (07.09.24 @ 16:38) >      Ventricular Rate 93 BPM    Atrial Rate 93 BPM    P-R Interval 110 ms    QRS Duration 72 ms    Q-T Interval 384 ms    QTC Calculation(Bazett) 477 ms    P Axis 49 degrees    R Axis 49 degrees    T Axis 53 degrees    Diagnosis Line Sinus rhythm with short CO  No previous ECGs available  Confirmed by Mckinley Dover ([id number]) on 7/10/2024 7:27:26 AM    < end of copied text >

## 2024-07-15 NOTE — BH CONSULTATION LIAISON ASSESSMENT NOTE - PAST PSYCHOTROPIC MEDICATION
Patient reports history of using Naltrexone 50mg for alcohol use disorder - states it did not help and was drinking alcohol while on Naltexone.   Reports history of using Xanax for anxiety (prescribed).

## 2024-07-15 NOTE — BH CONSULTATION LIAISON ASSESSMENT NOTE - NSBHMSEJUDGE_PSY_A_CORE
Provider





- Provider


Date of Admission: 


09/25/18 02:10





Attending physician: 


Gianna Patterson DO





Primary care physician: 





none


Consults: 





cardio


Time Spent in preparation of Discharge (in minutes): 35





Hospital Course





- Lab Results


Lab Results: 


                             Most Recent Lab Values











WBC  8.2 10^3/ul (4.5-11.0)  D 09/25/18  01:13    


 


RBC  3.84 10^6/uL (3.5-6.1)   09/25/18  01:13    


 


Hgb  12.9 g/dL (12.0-16.0)  D 09/25/18  01:13    


 


Hct  36.8 % (36.0-48.0)   09/25/18  01:13    


 


MCV  95.8 fl (80.0-105.0)   09/25/18  01:13    


 


MCH  33.6 pg (25.0-35.0)   09/25/18  01:13    


 


MCHC  35.1 g/dl (31.0-37.0)   09/25/18  01:13    


 


RDW  13.5 % (11.5-14.5)   09/25/18  01:13    


 


Plt Count  169 10^3/uL (120.0-450.0)   09/25/18  01:13    


 


MPV  10.1 fl (7.0-11.0)   09/25/18  01:13    


 


PT  12.1 SECONDS (9.4-12.5)   09/25/18  01:13    


 


INR  1.06   09/25/18  01:13    


 


APTT  29.4 Seconds (25.1-36.5)   09/25/18  01:13    


 


Sodium  137 mmol/L (132-148)   09/25/18  01:13    


 


Potassium  3.4 mmol/L (3.6-5.0)  L  09/25/18  01:13    


 


Chloride  102 mmol/L ()   09/25/18  01:13    


 


Carbon Dioxide  30 mmol/L (21-33)   09/25/18  01:13    


 


Anion Gap  9  (10-20)  L  09/25/18  01:13    


 


BUN  8 mg/dL (7-21)   09/25/18  01:13    


 


Creatinine  0.7 mg/dl (0.7-1.2)   09/25/18  01:13    


 


Est GFR ( Amer)  > 60   09/25/18  01:13    


 


Est GFR (Non-Af Amer)  > 60   09/25/18  01:13    


 


Random Glucose  125 mg/dL ()  H  09/25/18  01:13    


 


Calcium  8.7 mg/dL (8.4-10.5)   09/25/18  01:13    


 


Total Bilirubin  0.6 mg/dL (0.2-1.3)   09/25/18  01:13    


 


AST  53 U/L (14-36)  H  09/25/18  01:13    


 


ALT  50 U/L (7-56)   09/25/18  01:13    


 


Alkaline Phosphatase  76 U/L ()   09/25/18  01:13    


 


Lactate Dehydrogenase  552 U/L (333-699)   09/25/18  01:13    


 


Total Creatine Kinase  124 U/L ()   09/25/18  01:13    


 


Troponin I  < 0.01 ng/mL  09/25/18  08:10    


 


NT-Pro-B Natriuret Pep  166 pg/mL (0-450)   09/25/18  01:13    


 


Total Protein  6.5 g/dL (5.8-8.3)   09/25/18  01:13    


 


Albumin  3.5 g/dL (3.0-4.8)   09/25/18  01:13    


 


Globulin  3.0 gm/dL  09/25/18  01:13    


 


Albumin/Globulin Ratio  1.2  (1.1-1.8)   09/25/18  01:13    


 


Triglycerides  86 mg/dL ()   09/25/18  01:13    


 


Cholesterol  121 mg/dL (130-200)  L  09/25/18  01:13    


 


LDL Cholesterol Direct  44 mg/dL (0-129)   09/25/18  01:13    


 


HDL Cholesterol  55 mg/dL (29-60)   09/25/18  01:13    


 


TSH 3rd Generation  2.08 mIU/mL (0.46-4.68)   09/25/18  01:13    


 


Alcohol, Quantitative  < 10 mg/dL (0-10)   09/25/18  08:10    














- Hospital Course


Hospital Course: 





This is a 64 yeay old female with PMH of cocaine use, heroin use and alcoholism 

who presented to the ED complaining of constant 6/10 crushing left sided chest 

pain associated with palpitation which began at rest at night the day before. 

Patient was admitted to rule out ACS. , and nitro 0.4 prn was given, EKG 

showed no ST or T wave abnormalities, Troponins negative. Patient was found to 

have hypokalemia, potassium repleted. Patient went for Echo this morning. Shorty

after that she wanted to sign AMA. .Risks include but limited to progression of 

chest pain ,myocardial infarction, worsening of known and unknown conditions, 

death. Patient showed competency and understanding of the risk of signing AMA. 

Patient was adviced to take ASA untill being seen by her PMD.








On discharge:





Please follow up with your PMD within 1 week


Please take asprin 81 mg daily till you see your PMD


If symptoms reoccur please go to the nearest emergency department 





Discharge Exam





- Additional Findings


Additional findings: 





patient signed AMA. no physical exam done





Discharge Plan





- Follow Up Plan


Condition: STABLE


Disposition: AGAINST MEDICAL ADVICE


Additional Instructions: 


Please follow up with your PMD within 1 week


Please take asprin 81 mg daily till you see your PMD


If symptoms reoccur, please go to the nearest emergency department
Good

## 2024-07-15 NOTE — BH CONSULTATION LIAISON ASSESSMENT NOTE - NSBHCHARTREVIEWVS_PSY_A_CORE FT
Vital Signs Last 24 Hrs  T(C): 36.3 (15 Jul 2024 12:58), Max: 36.4 (15 Jul 2024 05:50)  T(F): 97.4 (15 Jul 2024 12:58), Max: 97.5 (15 Jul 2024 05:50)  HR: 91 (15 Jul 2024 12:58) (88 - 91)  BP: 101/68 (15 Jul 2024 12:58) (99/65 - 101/68)  BP(mean): --  RR: 17 (15 Jul 2024 12:58) (16 - 17)  SpO2: 97% (15 Jul 2024 12:58) (97% - 98%)    Parameters below as of 15 Jul 2024 12:58  Patient On (Oxygen Delivery Method): room air

## 2024-07-15 NOTE — PROGRESS NOTE ADULT - SUBJECTIVE AND OBJECTIVE BOX
Patient is a 44y old  Female who presents with a chief complaint of ETOH withdrawal (14 Jul 2024 08:52)      Patient seen and examined at bedside. No overnight events reported. Patient states she still feels some dizziness and tremors. Denies nausea, vomiting, sob, chest pain.     ALLERGIES:  Keflex (Anaphylaxis)    MEDICATIONS  (STANDING):  enoxaparin Injectable 40 milliGRAM(s) SubCutaneous every 24 hours  folic acid 1 milliGRAM(s) Oral daily  multivitamin 1 Tablet(s) Oral daily  nicotine -  14 mG/24Hr(s) Patch 1 Patch Transdermal daily  pantoprazole    Tablet 40 milliGRAM(s) Oral before breakfast  sertraline 50 milliGRAM(s) Oral daily  thiamine 100 milliGRAM(s) Oral daily  traZODone 100 milliGRAM(s) Oral at bedtime    MEDICATIONS  (PRN):  ibuprofen  Tablet. 400 milliGRAM(s) Oral every 6 hours PRN Temp greater or equal to 38C (100.4F), Mild Pain (1 - 3)  LORazepam   Injectable 2 milliGRAM(s) IV Push every 1 hour PRN Symptom-triggered: each CIWA -Ar score 8 or GREATER    Vital Signs Last 24 Hrs  T(F): 97.5 (15 Jul 2024 05:50), Max: 97.5 (15 Jul 2024 05:50)  HR: 88 (15 Jul 2024 05:50) (88 - 92)  BP: 99/65 (15 Jul 2024 05:50) (99/65 - 104/70)  RR: 16 (15 Jul 2024 05:50) (16 - 16)  SpO2: 98% (15 Jul 2024 05:50) (97% - 98%)  I&O's Summary    14 Jul 2024 07:01  -  15 Jul 2024 07:00  --------------------------------------------------------  IN: 860 mL / OUT: 0 mL / NET: 860 mL    15 Jul 2024 07:01  -  15 Jul 2024 10:51  --------------------------------------------------------  IN: 760 mL / OUT: 0 mL / NET: 760 mL      PHYSICAL EXAM:  General: NAD, A/O x 3, mild tremor   ENT: No gross hearing impairment, Moist mucous membranes, no thrush  Neck: Supple, No JVD  Lungs: Clear to auscultation bilaterally, good air entry, non-labored breathing  Cardio: RRR, S1/S2, No murmur  Abdomen: Soft, Nontender, Nondistended; Bowel sounds present  Extremities: No calf tenderness, No cyanosis, No pitting edema    LABS:                        11.0   6.51  )-----------( 346      ( 15 Jul 2024 07:06 )             34.0     07-15    139  |  105  |  23  ----------------------------<  84  4.6   |  24  |  0.67    Ca    9.4      15 Jul 2024 07:06  Mg     1.6     07-15                                        Urinalysis Basic - ( 15 Jul 2024 07:06 )    Color: x / Appearance: x / SG: x / pH: x  Gluc: 84 mg/dL / Ketone: x  / Bili: x / Urobili: x   Blood: x / Protein: x / Nitrite: x   Leuk Esterase: x / RBC: x / WBC x   Sq Epi: x / Non Sq Epi: x / Bacteria: x        COVID-19 PCR: NotDetec (07-09-24 @ 13:48)    RADIOLOGY & ADDITIONAL TESTS:    Care Discussed with Consultants/Other Providers:
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
## 2024-07-15 NOTE — BH CONSULTATION LIAISON ASSESSMENT NOTE - NSICDXBHSECONDARYDX_PSY_ALL_CORE
Alcohol use disorder, severe, dependence   F10.20  Post traumatic stress disorder (PTSD)   F43.10  Complicated bereavement   F43.21  Substance induced mood disorder   F19.94

## 2024-07-15 NOTE — BH CONSULTATION LIAISON ASSESSMENT NOTE - NSBHMSESPEECH_PSY_A_CORE
<-- Click to add NO pertinent Family History Normal volume, rate, productivity, spontaneity and articulation

## 2024-07-15 NOTE — BH CONSULTATION LIAISON ASSESSMENT NOTE - NSBHCHARTREVIEWLAB_PSY_A_CORE FT
Urine tox -  Alcohol, Blood: 335:    Complete Blood Count in AM (07.15.24 @ 07:06)   Nucleated RBC: 0 /100 WBCs  WBC Count: 6.51 K/uL  RBC Count: 3.61 M/uL  Hemoglobin: 11.0 g/dL  Hematocrit: 34.0 %  Mean Cell Volume: 94.2 fl  Mean Cell Hemoglobin: 30.5 pg  Mean Cell Hemoglobin Conc: 32.4 gm/dL  Red Cell Distrib Width: 14.2 %  Platelet Count - Automated: 346 K/uL    Basic Metabolic Panel in AM (07.15.24 @ 07:06)   Sodium: 139 mmol/L  Potassium: 4.6 mmol/L  Chloride: 105 mmol/L  Carbon Dioxide: 24 mmol/L  Anion Gap: 10 mmol/L  Blood Urea Nitrogen: 23 mg/dL  Creatinine: 0.67 mg/dL  Glucose: 84 mg/dL  Calcium: 9.4 mg/dL  eGFR: 111:

## 2024-07-15 NOTE — BH CONSULTATION LIAISON ASSESSMENT NOTE - ADDITIONAL DETAILS ALL
Despite multiple triggering events, patient denies any suicidal ideation, intent, or plan. Patient reports suicide attempt when she was around the age of 22, taking multiple "pills" (unsure of what they were). However, reports only feeling fatigued and sleeping off the effects of unknown pills used.
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
## 2024-07-15 NOTE — BH CONSULTATION LIAISON ASSESSMENT NOTE - NSBHCONSULTFOLLOWAFTERCARE_PSY_A_CORE FT
Patient would like to seek inpatient substance use rehab for continuation of care. Recommend connecting with social work to find in-patient substance use rehab locations that are in-network with patient's insurance.

## 2024-07-16 PROCEDURE — 99232 SBSQ HOSP IP/OBS MODERATE 35: CPT

## 2024-07-16 RX ORDER — ESCITALOPRAM OXALATE 20 MG/1
5 TABLET, FILM COATED ORAL DAILY
Refills: 0 | Status: DISCONTINUED | OUTPATIENT
Start: 2024-07-17 | End: 2024-07-17

## 2024-07-16 RX ORDER — SERTRALINE HYDROCHLORIDE 100 MG/1
25 TABLET, FILM COATED ORAL ONCE
Refills: 0 | Status: COMPLETED | OUTPATIENT
Start: 2024-07-16 | End: 2024-07-16

## 2024-07-16 RX ADMIN — Medication 1 TABLET(S): at 11:35

## 2024-07-16 RX ADMIN — TRAZODONE HYDROCHLORIDE 100 MILLIGRAM(S): 50 TABLET, FILM COATED ORAL at 22:05

## 2024-07-16 RX ADMIN — Medication 400 MILLIGRAM(S): at 21:08

## 2024-07-16 RX ADMIN — NICOTINE POLACRILEX 1 PATCH: 2 LOZENGE ORAL at 12:20

## 2024-07-16 RX ADMIN — NICOTINE POLACRILEX 1 PATCH: 2 LOZENGE ORAL at 07:00

## 2024-07-16 RX ADMIN — Medication 2 MILLIGRAM(S): at 15:37

## 2024-07-16 RX ADMIN — ENOXAPARIN SODIUM 40 MILLIGRAM(S): 100 INJECTION SUBCUTANEOUS at 06:01

## 2024-07-16 RX ADMIN — SERTRALINE HYDROCHLORIDE 25 MILLIGRAM(S): 100 TABLET, FILM COATED ORAL at 11:34

## 2024-07-16 RX ADMIN — Medication 2 MILLIGRAM(S): at 08:30

## 2024-07-16 RX ADMIN — NICOTINE POLACRILEX 1 PATCH: 2 LOZENGE ORAL at 19:42

## 2024-07-16 RX ADMIN — Medication 1 MILLIGRAM(S): at 11:35

## 2024-07-16 RX ADMIN — Medication 2 MILLIGRAM(S): at 20:38

## 2024-07-16 RX ADMIN — Medication 400 MILLIGRAM(S): at 20:38

## 2024-07-16 RX ADMIN — NICOTINE POLACRILEX 1 PATCH: 2 LOZENGE ORAL at 11:34

## 2024-07-16 RX ADMIN — PANTOPRAZOLE SODIUM 40 MILLIGRAM(S): 40 INJECTION, POWDER, FOR SOLUTION INTRAVENOUS at 06:01

## 2024-07-16 RX ADMIN — Medication 100 MILLIGRAM(S): at 11:35

## 2024-07-16 NOTE — PROGRESS NOTE ADULT - SUBJECTIVE AND OBJECTIVE BOX
Patient is a 44y old  Female who presents with a chief complaint of ETOH withdrawal (15 Jul 2024 10:51)      Patient seen and examined at bedside. No overnight events reported. Patient states she does not feel well, but feels similar to previous detox in the past. Per nursing, ELIZABETH 11 this morning.      ALLERGIES:  Keflex (Anaphylaxis)    MEDICATIONS  (STANDING):  enoxaparin Injectable 40 milliGRAM(s) SubCutaneous every 24 hours  folic acid 1 milliGRAM(s) Oral daily  multivitamin 1 Tablet(s) Oral daily  nicotine -  14 mG/24Hr(s) Patch 1 Patch Transdermal daily  pantoprazole    Tablet 40 milliGRAM(s) Oral before breakfast  sertraline 25 milliGRAM(s) Oral once  thiamine 100 milliGRAM(s) Oral daily  traZODone 100 milliGRAM(s) Oral at bedtime    MEDICATIONS  (PRN):  ibuprofen  Tablet. 400 milliGRAM(s) Oral every 6 hours PRN Temp greater or equal to 38C (100.4F), Mild Pain (1 - 3)  LORazepam   Injectable 2 milliGRAM(s) IV Push every 1 hour PRN Symptom-triggered: each PAYALWA -Ar score 8 or GREATER    Vital Signs Last 24 Hrs  T(F): 97.6 (16 Jul 2024 05:59), Max: 98.6 (15 Jul 2024 19:23)  HR: 74 (16 Jul 2024 05:59) (74 - 91)  BP: 106/65 (16 Jul 2024 05:59) (101/68 - 106/72)  RR: 16 (16 Jul 2024 05:59) (16 - 17)  SpO2: 98% (16 Jul 2024 05:59) (97% - 98%)  I&O's Summary    15 Jul 2024 07:01  -  16 Jul 2024 07:00  --------------------------------------------------------  IN: 760 mL / OUT: 0 mL / NET: 760 mL    16 Jul 2024 07:01  -  16 Jul 2024 10:43  --------------------------------------------------------  IN: 620 mL / OUT: 0 mL / NET: 620 mL      PHYSICAL EXAM:  General: NAD, A/O x 3, mild tremors   ENT: No gross hearing impairment, Moist mucous membranes, no thrush  Neck: Supple, No JVD  Lungs: Clear to auscultation bilaterally, good air entry, non-labored breathing  Cardio: RRR, S1/S2, No murmur  Abdomen: Soft, Nontender, Nondistended; Bowel sounds present  Extremities: No calf tenderness, No cyanosis, No pitting edema    LABS:                        11.0   6.51  )-----------( 346      ( 15 Jul 2024 07:06 )             34.0     07-15    139  |  105  |  23  ----------------------------<  84  4.6   |  24  |  0.67    Ca    9.4      15 Jul 2024 07:06  Mg     1.6     07-15                                        Urinalysis Basic - ( 15 Jul 2024 07:06 )    Color: x / Appearance: x / SG: x / pH: x  Gluc: 84 mg/dL / Ketone: x  / Bili: x / Urobili: x   Blood: x / Protein: x / Nitrite: x   Leuk Esterase: x / RBC: x / WBC x   Sq Epi: x / Non Sq Epi: x / Bacteria: x        COVID-19 PCR: NotDetec (07-09-24 @ 13:48)    RADIOLOGY & ADDITIONAL TESTS:    Care Discussed with Consultants/Other Providers:
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
## 2024-07-17 VITALS
DIASTOLIC BLOOD PRESSURE: 61 MMHG | SYSTOLIC BLOOD PRESSURE: 98 MMHG | OXYGEN SATURATION: 97 % | RESPIRATION RATE: 16 BRPM | HEART RATE: 88 BPM | TEMPERATURE: 98 F

## 2024-07-17 LAB
ANION GAP SERPL CALC-SCNC: 6 MMOL/L — SIGNIFICANT CHANGE UP (ref 5–17)
BUN SERPL-MCNC: 21 MG/DL — SIGNIFICANT CHANGE UP (ref 7–23)
CALCIUM SERPL-MCNC: 9.5 MG/DL — SIGNIFICANT CHANGE UP (ref 8.4–10.5)
CHLORIDE SERPL-SCNC: 104 MMOL/L — SIGNIFICANT CHANGE UP (ref 96–108)
CO2 SERPL-SCNC: 27 MMOL/L — SIGNIFICANT CHANGE UP (ref 22–31)
CREAT SERPL-MCNC: 0.66 MG/DL — SIGNIFICANT CHANGE UP (ref 0.5–1.3)
EGFR: 112 ML/MIN/1.73M2 — SIGNIFICANT CHANGE UP
GLUCOSE SERPL-MCNC: 85 MG/DL — SIGNIFICANT CHANGE UP (ref 70–99)
HCT VFR BLD CALC: 33.8 % — LOW (ref 34.5–45)
HGB BLD-MCNC: 11.1 G/DL — LOW (ref 11.5–15.5)
MAGNESIUM SERPL-MCNC: 1.5 MG/DL — LOW (ref 1.6–2.6)
MCHC RBC-ENTMCNC: 30.2 PG — SIGNIFICANT CHANGE UP (ref 27–34)
MCHC RBC-ENTMCNC: 32.8 GM/DL — SIGNIFICANT CHANGE UP (ref 32–36)
MCV RBC AUTO: 92.1 FL — SIGNIFICANT CHANGE UP (ref 80–100)
NRBC # BLD: 0 /100 WBCS — SIGNIFICANT CHANGE UP (ref 0–0)
PLATELET # BLD AUTO: 355 K/UL — SIGNIFICANT CHANGE UP (ref 150–400)
POTASSIUM SERPL-MCNC: 4.3 MMOL/L — SIGNIFICANT CHANGE UP (ref 3.5–5.3)
POTASSIUM SERPL-SCNC: 4.3 MMOL/L — SIGNIFICANT CHANGE UP (ref 3.5–5.3)
RBC # BLD: 3.67 M/UL — LOW (ref 3.8–5.2)
RBC # FLD: 14.1 % — SIGNIFICANT CHANGE UP (ref 10.3–14.5)
SODIUM SERPL-SCNC: 137 MMOL/L — SIGNIFICANT CHANGE UP (ref 135–145)
WBC # BLD: 6.2 K/UL — SIGNIFICANT CHANGE UP (ref 3.8–10.5)
WBC # FLD AUTO: 6.2 K/UL — SIGNIFICANT CHANGE UP (ref 3.8–10.5)

## 2024-07-17 PROCEDURE — 99239 HOSP IP/OBS DSCHRG MGMT >30: CPT

## 2024-07-17 RX ORDER — LORAZEPAM 0.5 MG
2 TABLET ORAL
Refills: 0 | Status: DISCONTINUED | OUTPATIENT
Start: 2024-07-17 | End: 2024-07-17

## 2024-07-17 RX ORDER — THIAMINE HCL 100 MG
1 TABLET ORAL
Qty: 0 | Refills: 0 | DISCHARGE
Start: 2024-07-17

## 2024-07-17 RX ORDER — FOLIC ACID
1 POWDER (GRAM) MISCELLANEOUS
Qty: 0 | Refills: 0 | DISCHARGE
Start: 2024-07-17

## 2024-07-17 RX ORDER — SERTRALINE HYDROCHLORIDE 100 MG/1
1 TABLET, FILM COATED ORAL
Refills: 0 | DISCHARGE

## 2024-07-17 RX ORDER — ESCITALOPRAM OXALATE 20 MG/1
1 TABLET, FILM COATED ORAL
Qty: 0 | Refills: 0 | DISCHARGE
Start: 2024-07-17

## 2024-07-17 RX ORDER — MAGNESIUM SULFATE 100 %
2 POWDER (GRAM) MISCELLANEOUS ONCE
Refills: 0 | Status: COMPLETED | OUTPATIENT
Start: 2024-07-17 | End: 2024-07-17

## 2024-07-17 RX ORDER — NICOTINE POLACRILEX 2 MG/1
1 LOZENGE ORAL
Qty: 0 | Refills: 0 | DISCHARGE
Start: 2024-07-17

## 2024-07-17 RX ADMIN — ESCITALOPRAM OXALATE 5 MILLIGRAM(S): 20 TABLET, FILM COATED ORAL at 11:30

## 2024-07-17 RX ADMIN — NICOTINE POLACRILEX 1 PATCH: 2 LOZENGE ORAL at 07:48

## 2024-07-17 RX ADMIN — Medication 1 TABLET(S): at 11:30

## 2024-07-17 RX ADMIN — NICOTINE POLACRILEX 1 PATCH: 2 LOZENGE ORAL at 11:00

## 2024-07-17 RX ADMIN — PANTOPRAZOLE SODIUM 40 MILLIGRAM(S): 40 INJECTION, POWDER, FOR SOLUTION INTRAVENOUS at 05:25

## 2024-07-17 RX ADMIN — Medication 25 GRAM(S): at 08:42

## 2024-07-17 RX ADMIN — Medication 400 MILLIGRAM(S): at 08:03

## 2024-07-17 RX ADMIN — Medication 1 MILLIGRAM(S): at 11:30

## 2024-07-17 RX ADMIN — NICOTINE POLACRILEX 1 PATCH: 2 LOZENGE ORAL at 11:30

## 2024-07-17 RX ADMIN — Medication 2 MILLIGRAM(S): at 08:03

## 2024-07-17 RX ADMIN — Medication 100 MILLIGRAM(S): at 11:30

## 2024-07-17 RX ADMIN — ENOXAPARIN SODIUM 40 MILLIGRAM(S): 100 INJECTION SUBCUTANEOUS at 05:25

## 2024-07-17 RX ADMIN — Medication 400 MILLIGRAM(S): at 09:00

## 2024-07-17 NOTE — DISCHARGE NOTE NURSING/CASE MANAGEMENT/SOCIAL WORK - NSDCPEEMAIL_GEN_ALL_CORE
[facility] email [email or web address]

## 2024-07-17 NOTE — PROGRESS NOTE ADULT - NS ATTEND OPT1 GEN_ALL_CORE

## 2024-07-17 NOTE — PROGRESS NOTE ADULT - NS ATTEND AMEND GEN_ALL_CORE FT
agree with above  improving  SW working on inpt alc rehab  f/u am labs
agree with above  remains on CIWA protocol for active withdrawals  monitor CIWA scoring  social work following  f/u am labs  replete jose miguel chapinn
agree with above  withdrawal symptoms much improved  no new complaints  d/c in 24 hours if medically stable
agree with above  clinically improving  still with underlying anxiety  psych consulted  SW working on inpatient alcohol rehab placement. referrals being sent out.
Seen and examined. Chart reviewed.   patient is improving clinically.   Agree with above a/p  Edited where ever is necessary    reported headaches, shakiness, anxiety. no hallucination. no crawling sensation.     denied fever/chills/CP/SOB/cough/palpitation/dizziness/abdominal pian/nausea/vomiting/diarrhoea/constipation/dysuria/leg or calf pain/headaches.all other ROS neg    exam: stable    a/w alcohol intoxication, now withdrawal. no h/o DT  wants inpatient substance abuse rehab. SW consulted.   tele: no events. DC tele. off IVF. tolerating diet. encouraged ambulation. on symptoms trigger Ativan. CIWA on my assessment in am was 10. RN was informed to give one dose of ativan. continue to monitor
agree with above  BELKYS, continue with symptom triggered ativan  SW following  f/u labs, f/u lytes
agree with above  On CIWA protocol  Ativan PRN  monitor withdrawal symptoms, slow improvement  monitor and optimize lytes  f/u am labs  SW for dispo planning once pt has completed withdrawing
agree with above  discharge to inpatient alcohol rehab today  see discharge summary for full plan of care

## 2024-07-17 NOTE — DISCHARGE NOTE NURSING/CASE MANAGEMENT/SOCIAL WORK - NSSBIRTALCACTIVEREFTXDET_GEN_A_CORE
SW met with pt to complete SBIRT and discuss d/c plan to inpatient rehab. Pt in agreement to inpatient rehab. SW to continue to follow.
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
## 2024-07-17 NOTE — DISCHARGE NOTE NURSING/CASE MANAGEMENT/SOCIAL WORK - NSDCPEWEB_GEN_ALL_CORE
[facility] website --- [email or web address] website --- [email or web address]

## 2024-07-17 NOTE — PROGRESS NOTE ADULT - SUBJECTIVE AND OBJECTIVE BOX
Patient is a 44y old Female who presents with a chief complaint of ETOH withdrawal (16 Jul 2024 10:43)      Patient seen and examined at bedside. No overnight events reported. Patient states she is feeling a little better this morning. Patient denies chest pain, sob, nausea, vomiting.     ALLERGIES:  Keflex (Anaphylaxis)    MEDICATIONS  (STANDING):  enoxaparin Injectable 40 milliGRAM(s) SubCutaneous every 24 hours  escitalopram 5 milliGRAM(s) Oral daily  folic acid 1 milliGRAM(s) Oral daily  multivitamin 1 Tablet(s) Oral daily  nicotine -  14 mG/24Hr(s) Patch 1 Patch Transdermal daily  pantoprazole    Tablet 40 milliGRAM(s) Oral before breakfast  thiamine 100 milliGRAM(s) Oral daily  traZODone 100 milliGRAM(s) Oral at bedtime    MEDICATIONS  (PRN):  ibuprofen  Tablet. 400 milliGRAM(s) Oral every 6 hours PRN Temp greater or equal to 38C (100.4F), Mild Pain (1 - 3)  LORazepam   Injectable 2 milliGRAM(s) IV Push every 1 hour PRN Symptom-triggered: each CIWA -Ar score 8 or GREATER    Vital Signs Last 24 Hrs  T(F): 98 (17 Jul 2024 05:04), Max: 100 (16 Jul 2024 13:02)  HR: 74 (17 Jul 2024 05:04) (71 - 75)  BP: 100/64 (17 Jul 2024 05:04) (100/64 - 109/74)  RR: 16 (17 Jul 2024 05:04) (16 - 18)  SpO2: 97% (17 Jul 2024 05:04) (92% - 97%)  I&O's Summary    16 Jul 2024 07:01  -  17 Jul 2024 07:00  --------------------------------------------------------  IN: 620 mL / OUT: 0 mL / NET: 620 mL      PHYSICAL EXAM:  General: NAD, A/O x 3  ENT: No gross hearing impairment, Moist mucous membranes, no thrush  Neck: Supple, No JVD  Lungs: Clear to auscultation bilaterally, good air entry, non-labored breathing  Cardio: RRR, S1/S2, No murmur  Abdomen: Soft, Nontender, Nondistended; Bowel sounds present  Extremities: No calf tenderness, No cyanosis, No pitting edema  Psych: Appropriate mood and affect    LABS:                        11.1   6.20  )-----------( 355      ( 17 Jul 2024 06:00 )             33.8     07-17    137  |  104  |  21  ----------------------------<  85  4.3   |  27  |  0.66    Ca    9.5      17 Jul 2024 06:00  Mg     1.5     07-17                                        Urinalysis Basic - ( 17 Jul 2024 06:00 )    Color: x / Appearance: x / SG: x / pH: x  Gluc: 85 mg/dL / Ketone: x  / Bili: x / Urobili: x   Blood: x / Protein: x / Nitrite: x   Leuk Esterase: x / RBC: x / WBC x   Sq Epi: x / Non Sq Epi: x / Bacteria: x        COVID-19 PCR: Lillianatec (07-09-24 @ 13:48)    RADIOLOGY & ADDITIONAL TESTS:    Care Discussed with Consultants/Other Providers:

## 2024-07-17 NOTE — DISCHARGE NOTE NURSING/CASE MANAGEMENT/SOCIAL WORK - NSDCPEFALRISK_GEN_ALL_CORE
For information on Fall & Injury Prevention, visit: [email or web address] OR  [email or web address] OR  https://www.cdc.gov/steadi/patient.html

## 2024-07-17 NOTE — DISCHARGE NOTE NURSING/CASE MANAGEMENT/SOCIAL WORK - PATIENT PORTAL LINK FT
You can access the FollowMyHealth Patient Portal offered by [facility] by registering at the following website: [email or web address]. By joining [email or web address]’s FollowMyHealth portal, you will also be able to view your health information using other applications (apps) compatible with our system.

## 2024-07-22 ENCOUNTER — EMERGENCY (EMERGENCY)
Facility: HOSPITAL | Age: 44
LOS: 1 days | Discharge: HOME | End: 2024-07-22
Attending: EMERGENCY MEDICINE
Payer: SELF-PAY

## 2024-07-22 VITALS
WEIGHT: 149.91 LBS | RESPIRATION RATE: 16 BRPM | OXYGEN SATURATION: 99 % | HEART RATE: 78 BPM | DIASTOLIC BLOOD PRESSURE: 73 MMHG | HEIGHT: 65 IN | TEMPERATURE: 98 F | SYSTOLIC BLOOD PRESSURE: 116 MMHG

## 2024-07-22 DIAGNOSIS — Z98.890 OTHER SPECIFIED POSTPROCEDURAL STATES: Chronic | ICD-10-CM

## 2024-07-22 PROBLEM — F41.9 ANXIETY DISORDER, UNSPECIFIED: Chronic | Status: ACTIVE | Noted: 2024-07-09

## 2024-07-22 PROBLEM — K21.9 GASTRO-ESOPHAGEAL REFLUX DISEASE WITHOUT ESOPHAGITIS: Chronic | Status: ACTIVE | Noted: 2024-07-09

## 2024-07-22 PROCEDURE — 99283 EMERGENCY DEPT VISIT LOW MDM: CPT

## 2024-07-22 PROCEDURE — 96372 THER/PROPH/DIAG INJ SC/IM: CPT

## 2024-07-22 PROCEDURE — 73130 X-RAY EXAM OF HAND: CPT | Mod: 26,LT

## 2024-07-22 PROCEDURE — 26720 TREAT FINGER FRACTURE EACH: CPT | Mod: 54,F4

## 2024-07-22 PROCEDURE — 99284 EMERGENCY DEPT VISIT MOD MDM: CPT | Mod: 57

## 2024-07-22 PROCEDURE — 73130 X-RAY EXAM OF HAND: CPT

## 2024-07-22 RX ORDER — KETOROLAC TROMETHAMINE 30 MG/ML
15 INJECTION, SOLUTION INTRAMUSCULAR ONCE
Refills: 0 | Status: DISCONTINUED | OUTPATIENT
Start: 2024-07-22 | End: 2024-07-22

## 2024-07-22 RX ADMIN — KETOROLAC TROMETHAMINE 15 MILLIGRAM(S): 30 INJECTION, SOLUTION INTRAMUSCULAR at 12:04

## 2024-07-22 NOTE — ED PROVIDER NOTE - PHYSICAL EXAMINATION
Constitutional: Awake, alert and oriented. In no acute distress. Well appearing.  HEENT: NC/AT. Moist mucous membranes.  Eyes: No scleral icterus. EOMI.  MSK: (+) swelling/bruising/tenderness over left 5th digit. Able to flex left 5th DIP (not able to flex PIP), sensation intact and capillary refills < 2 seconds left fingers. Radial pulses 2+ LUE.   Neuro: Awake, alert & oriented x 3. Moves all extremities symmetrically. Strength 5/5 all upper and lower extremities  Psych: calm, cooperative, normal affect

## 2024-07-22 NOTE — ED PROVIDER NOTE - PATIENT PORTAL LINK FT
You can access the FollowMyHealth Patient Portal offered by [facility] by registering at the following website: [email or web address]. By joining [facility]’s FollowMyHealth portal, you will also be able to view your health information using other applications (apps) compatible with our system.

## 2024-07-22 NOTE — ED PROVIDER NOTE - OBJECTIVE STATEMENT
43 y/o F with PMhx etoh abuse (currently at [facility] detox center for the past 14 days) p/w c/o left pinky pain/swelling/bruising since yesterday s/p trying to catch a baseball. Denies any head strike or LOC. Denies any numbness over left fingers. No fever/chills, n/v, headaches, dizziness, tremors, sob, cp, abdominal pain, back pain, urinary symptoms, rash, and with no other c/o. Of note, pt is right hand dominant.

## 2024-07-22 NOTE — ED PROCEDURE NOTE - PROCEDURE ADDITIONAL DETAILS
Instructed to f/u with hand clinic within1 week. Motrin prn pain. Strict ED return precautions given if any new or worsening symptoms.

## 2024-07-22 NOTE — ED PROVIDER NOTE - CLINICAL SUMMARY MEDICAL DECISION MAKING FREE TEXT BOX
45 y/o F with PMhx etoh abuse (currently at [facility] detox center for the past 14 days) p/w c/o left pinky pain/swelling/bruising since yesterday s/p trying to catch a baseball. Will rule out traumatic injury. Plan for pain meds, xray left hand. Instructed to take motrin and to f/u with hand clinic within 1 week. Strict ED return precautions given if any new or worsening symptoms. 43 y/o F with PMhx etoh abuse (currently at [facility] detox center for the past 14 days) p/w c/o left pinky pain/swelling/bruising since yesterday s/p trying to catch a baseball. Will rule out traumatic injury. Plan for pain meds, xray left hand wet read showing distal phalanx closed fracture. John finger splinting and instructed to take motrin and to f/u with hand clinic within 1 week. Strict ED return precautions given if any new or worsening symptoms. 45 y/o F with PMhx etoh abuse (currently at [facility] detox center for the past 14 days) p/w c/o left pinky pain/swelling/bruising since yesterday s/p trying to catch a baseball. Will rule out traumatic injury. Plan for pain meds, xray left hand wet read showing distal phalanx closed fracture. John finger tape and finger aluminium splinting and instructed to take motrin and to f/u with hand clinic within 1 week. Strict ED return precautions given if any new or worsening symptoms.

## 2024-07-22 NOTE — ED PROVIDER NOTE - CARE PLAN
Principal Discharge DX:	Contusion of left little finger   1 Principal Discharge DX:	Closed fracture of phalanx of left little finger

## 2024-07-22 NOTE — ED PROVIDER NOTE - CARE PROVIDER_API CALL
Moiz Corona  Orthopaedic Surgery  [address] [place] [id number]  Phone: [phone]  Fax: [phone]  Follow Up Time: 7-10 Days

## 2024-07-22 NOTE — ED PROVIDER NOTE - NSFOLLOWUPINSTRUCTIONS_ED_ALL_ED_FT
Please take motrin/ibuprofen 600mg every 8 hours with food as needed for pain.  1)Follow up with hand clinic within 1 week  Return to the emergency room if you are experiencing any new or worsening symptoms    Contusion    A contusion is a deep bruise. Contusions are the result of a blunt injury to tissues and muscle fibers under the skin. The skin overlying the contusion may turn blue, purple, or yellow. Symptoms also include pain and swelling in the injured area.    SEEK IMMEDIATE MEDICAL CARE IF YOU HAVE ANY OF THE FOLLOWING SYMPTOMS: severe pain, numbness, tingling, pain, weakness, or skin color/temperature change in any part of your body distal to the injury. Please take motrin/ibuprofen 600mg every 8 hours with food as needed for pain.  1)Follow up with hand clinic within 1 week  Return to the emergency room if you are experiencing any new or worsening symptoms    Fracture    A fracture is a break in one of your bones. This can occur from a variety of injuries, especially traumatic ones. Symptoms include pain, bruising, or swelling. Do not use the injured limb. If a fracture is in one of the bones below your waist, do not put weight on that limb unless instructed to do so by your healthcare provider. Crutches or a cane may have been provided. A splint or cast may have been applied by your health care provider. Make sure to keep it dry and follow up with an orthopedist as instructed.    SEEK IMMEDIATE MEDICAL CARE IF YOU HAVE ANY OF THE FOLLOWING SYMPTOMS: numbness, tingling, increasing pain, or weakness in any part of the injured limb.

## 2024-08-22 PROCEDURE — 81003 URINALYSIS AUTO W/O SCOPE: CPT

## 2024-08-22 PROCEDURE — 80307 DRUG TEST PRSMV CHEM ANLYZR: CPT

## 2024-08-22 PROCEDURE — 96365 THER/PROPH/DIAG IV INF INIT: CPT

## 2024-08-22 PROCEDURE — 99285 EMERGENCY DEPT VISIT HI MDM: CPT

## 2024-08-22 PROCEDURE — 96375 TX/PRO/DX INJ NEW DRUG ADDON: CPT

## 2024-08-22 PROCEDURE — 84702 CHORIONIC GONADOTROPIN TEST: CPT

## 2024-08-22 PROCEDURE — 80048 BASIC METABOLIC PNL TOTAL CA: CPT

## 2024-08-22 PROCEDURE — 93005 ELECTROCARDIOGRAM TRACING: CPT

## 2024-08-22 PROCEDURE — 85027 COMPLETE CBC AUTOMATED: CPT

## 2024-08-22 PROCEDURE — 83735 ASSAY OF MAGNESIUM: CPT

## 2024-08-22 PROCEDURE — 85025 COMPLETE CBC W/AUTO DIFF WBC: CPT

## 2024-08-22 PROCEDURE — 36415 COLL VENOUS BLD VENIPUNCTURE: CPT

## 2024-08-22 PROCEDURE — 87635 SARS-COV-2 COVID-19 AMP PRB: CPT

## 2024-08-28 ENCOUNTER — INPATIENT (INPATIENT)
Facility: HOSPITAL | Age: 44
LOS: 5 days | Discharge: HOME | DRG: 897 | End: 2024-09-03
Attending: FAMILY MEDICINE | Admitting: INTERNAL MEDICINE
Payer: SELF-PAY

## 2024-08-28 VITALS
TEMPERATURE: 98 F | RESPIRATION RATE: 17 BRPM | WEIGHT: 149.91 LBS | DIASTOLIC BLOOD PRESSURE: 73 MMHG | HEIGHT: 65 IN | SYSTOLIC BLOOD PRESSURE: 127 MMHG | HEART RATE: 100 BPM | OXYGEN SATURATION: 97 %

## 2024-08-28 DIAGNOSIS — Z98.890 OTHER SPECIFIED POSTPROCEDURAL STATES: Chronic | ICD-10-CM

## 2024-08-28 DIAGNOSIS — F10.920 ALCOHOL USE, UNSPECIFIED WITH INTOXICATION, UNCOMPLICATED: ICD-10-CM

## 2024-08-28 LAB
ALBUMIN SERPL ELPH-MCNC: 3.5 G/DL — SIGNIFICANT CHANGE UP (ref 3.3–5)
ALP SERPL-CCNC: 106 U/L — SIGNIFICANT CHANGE UP (ref 40–120)
ALT FLD-CCNC: 22 U/L — SIGNIFICANT CHANGE UP (ref 10–45)
ANION GAP SERPL CALC-SCNC: 15 MMOL/L — SIGNIFICANT CHANGE UP (ref 5–17)
APAP SERPL-MCNC: <1 UG/ML — LOW (ref 10–30)
AST SERPL-CCNC: 38 U/L — SIGNIFICANT CHANGE UP (ref 10–40)
BASOPHILS # BLD AUTO: 0.03 K/UL — SIGNIFICANT CHANGE UP (ref 0–0.2)
BASOPHILS NFR BLD AUTO: 0.5 % — SIGNIFICANT CHANGE UP (ref 0–2)
BILIRUB SERPL-MCNC: 0.8 MG/DL — SIGNIFICANT CHANGE UP (ref 0.2–1.2)
BUN SERPL-MCNC: 16 MG/DL — SIGNIFICANT CHANGE UP (ref 7–23)
CALCIUM SERPL-MCNC: 9.5 MG/DL — SIGNIFICANT CHANGE UP (ref 8.4–10.5)
CHLORIDE SERPL-SCNC: 104 MMOL/L — SIGNIFICANT CHANGE UP (ref 96–108)
CO2 SERPL-SCNC: 21 MMOL/L — LOW (ref 22–31)
CREAT SERPL-MCNC: 0.75 MG/DL — SIGNIFICANT CHANGE UP (ref 0.5–1.3)
EGFR: 101 ML/MIN/1.73M2 — SIGNIFICANT CHANGE UP
EOSINOPHIL # BLD AUTO: 0 K/UL — SIGNIFICANT CHANGE UP (ref 0–0.5)
EOSINOPHIL NFR BLD AUTO: 0 % — SIGNIFICANT CHANGE UP (ref 0–6)
ETHANOL SERPL-MCNC: 99 MG/DL — HIGH (ref 0–3)
GLUCOSE SERPL-MCNC: 72 MG/DL — SIGNIFICANT CHANGE UP (ref 70–99)
HCG SERPL-ACNC: <1 MIU/ML — SIGNIFICANT CHANGE UP
HCT VFR BLD CALC: 30.1 % — LOW (ref 34.5–45)
HGB BLD-MCNC: 10.3 G/DL — LOW (ref 11.5–15.5)
IMM GRANULOCYTES NFR BLD AUTO: 0.4 % — SIGNIFICANT CHANGE UP (ref 0–0.9)
LIDOCAIN IGE QN: 25 U/L — SIGNIFICANT CHANGE UP (ref 16–77)
LYMPHOCYTES # BLD AUTO: 1.5 K/UL — SIGNIFICANT CHANGE UP (ref 1–3.3)
LYMPHOCYTES # BLD AUTO: 26.5 % — SIGNIFICANT CHANGE UP (ref 13–44)
MCHC RBC-ENTMCNC: 29.2 PG — SIGNIFICANT CHANGE UP (ref 27–34)
MCHC RBC-ENTMCNC: 34.2 GM/DL — SIGNIFICANT CHANGE UP (ref 32–36)
MCV RBC AUTO: 85.3 FL — SIGNIFICANT CHANGE UP (ref 80–100)
MONOCYTES # BLD AUTO: 0.43 K/UL — SIGNIFICANT CHANGE UP (ref 0–0.9)
MONOCYTES NFR BLD AUTO: 7.6 % — SIGNIFICANT CHANGE UP (ref 2–14)
NEUTROPHILS # BLD AUTO: 3.67 K/UL — SIGNIFICANT CHANGE UP (ref 1.8–7.4)
NEUTROPHILS NFR BLD AUTO: 65 % — SIGNIFICANT CHANGE UP (ref 43–77)
NRBC # BLD: 0 /100 WBCS — SIGNIFICANT CHANGE UP (ref 0–0)
PLATELET # BLD AUTO: 366 K/UL — SIGNIFICANT CHANGE UP (ref 150–400)
POTASSIUM SERPL-MCNC: 4.8 MMOL/L — SIGNIFICANT CHANGE UP (ref 3.5–5.3)
POTASSIUM SERPL-SCNC: 4.8 MMOL/L — SIGNIFICANT CHANGE UP (ref 3.5–5.3)
PROT SERPL-MCNC: 7.9 G/DL — SIGNIFICANT CHANGE UP (ref 6–8.3)
RBC # BLD: 3.53 M/UL — LOW (ref 3.8–5.2)
RBC # FLD: 13.4 % — SIGNIFICANT CHANGE UP (ref 10.3–14.5)
SALICYLATES SERPL-MCNC: 0.9 MG/DL — LOW (ref 3–30)
SODIUM SERPL-SCNC: 140 MMOL/L — SIGNIFICANT CHANGE UP (ref 135–145)
WBC # BLD: 5.65 K/UL — SIGNIFICANT CHANGE UP (ref 3.8–10.5)
WBC # FLD AUTO: 5.65 K/UL — SIGNIFICANT CHANGE UP (ref 3.8–10.5)

## 2024-08-28 PROCEDURE — 99223 1ST HOSP IP/OBS HIGH 75: CPT

## 2024-08-28 PROCEDURE — 72125 CT NECK SPINE W/O DYE: CPT | Mod: 26

## 2024-08-28 PROCEDURE — 93010 ELECTROCARDIOGRAM REPORT: CPT

## 2024-08-28 PROCEDURE — 70450 CT HEAD/BRAIN W/O DYE: CPT | Mod: 26

## 2024-08-28 PROCEDURE — 99285 EMERGENCY DEPT VISIT HI MDM: CPT

## 2024-08-28 RX ORDER — ACETAMINOPHEN 325 MG/1
650 TABLET ORAL EVERY 6 HOURS
Refills: 0 | Status: DISCONTINUED | OUTPATIENT
Start: 2024-08-28 | End: 2024-09-03

## 2024-08-28 RX ORDER — FOLIC ACID 1 MG
1 TABLET ORAL DAILY
Refills: 0 | Status: DISCONTINUED | OUTPATIENT
Start: 2024-08-28 | End: 2024-09-03

## 2024-08-28 RX ORDER — LORAZEPAM 4 MG/ML
2 INJECTION INTRAMUSCULAR; INTRAVENOUS ONCE
Refills: 0 | Status: DISCONTINUED | OUTPATIENT
Start: 2024-08-28 | End: 2024-08-28

## 2024-08-28 RX ORDER — LORAZEPAM 4 MG/ML
2 INJECTION INTRAMUSCULAR; INTRAVENOUS
Refills: 0 | Status: DISCONTINUED | OUTPATIENT
Start: 2024-08-28 | End: 2024-08-29

## 2024-08-28 RX ORDER — THIAMINE HCL 250 MG
100 TABLET ORAL DAILY
Refills: 0 | Status: DISCONTINUED | OUTPATIENT
Start: 2024-08-28 | End: 2024-09-03

## 2024-08-28 RX ORDER — ENOXAPARIN SODIUM 100 MG/ML
40 INJECTION SUBCUTANEOUS EVERY 24 HOURS
Refills: 0 | Status: DISCONTINUED | OUTPATIENT
Start: 2024-08-28 | End: 2024-08-31

## 2024-08-28 RX ORDER — LORAZEPAM 4 MG/ML
INJECTION INTRAMUSCULAR; INTRAVENOUS
Refills: 0 | Status: DISCONTINUED | OUTPATIENT
Start: 2024-08-28 | End: 2024-08-29

## 2024-08-28 RX ORDER — PANTOPRAZOLE SODIUM 40 MG
40 TABLET, DELAYED RELEASE (ENTERIC COATED) ORAL
Refills: 0 | Status: DISCONTINUED | OUTPATIENT
Start: 2024-08-28 | End: 2024-09-03

## 2024-08-28 RX ORDER — LORAZEPAM 4 MG/ML
1.5 INJECTION INTRAMUSCULAR; INTRAVENOUS EVERY 4 HOURS
Refills: 0 | Status: DISCONTINUED | OUTPATIENT
Start: 2024-08-29 | End: 2024-08-29

## 2024-08-28 RX ORDER — LORAZEPAM 4 MG/ML
2 INJECTION INTRAMUSCULAR; INTRAVENOUS EVERY 4 HOURS
Refills: 0 | Status: DISCONTINUED | OUTPATIENT
Start: 2024-08-28 | End: 2024-08-29

## 2024-08-28 RX ORDER — SODIUM CHLORIDE 9 MG/ML
1000 INJECTION INTRAMUSCULAR; INTRAVENOUS; SUBCUTANEOUS ONCE
Refills: 0 | Status: COMPLETED | OUTPATIENT
Start: 2024-08-28 | End: 2024-08-28

## 2024-08-28 RX ADMIN — LORAZEPAM 2 MILLIGRAM(S): 4 INJECTION INTRAMUSCULAR; INTRAVENOUS at 21:59

## 2024-08-28 RX ADMIN — Medication 100 MILLILITER(S): at 22:30

## 2024-08-28 RX ADMIN — SODIUM CHLORIDE 1000 MILLILITER(S): 9 INJECTION INTRAMUSCULAR; INTRAVENOUS; SUBCUTANEOUS at 18:45

## 2024-08-28 RX ADMIN — SODIUM CHLORIDE 1000 MILLILITER(S): 9 INJECTION INTRAMUSCULAR; INTRAVENOUS; SUBCUTANEOUS at 17:45

## 2024-08-28 RX ADMIN — LORAZEPAM 2 MILLIGRAM(S): 4 INJECTION INTRAMUSCULAR; INTRAVENOUS at 18:06

## 2024-08-28 RX ADMIN — LORAZEPAM 2 MILLIGRAM(S): 4 INJECTION INTRAMUSCULAR; INTRAVENOUS at 19:43

## 2024-08-28 NOTE — PATIENT PROFILE ADULT - FALL HARM RISK - RISK INTERVENTIONS
Assistance OOB with selected safe patient handling equipment/Assistance with ambulation/Communicate Fall Risk and Risk Factors to all staff, patient, and family/Monitor for mental status changes/Monitor gait and stability/Reinforce activity limits and safety measures with patient and family/Review medications for side effects contributing to fall risk/Sit up slowly, dangle for a short time, stand at bedside before walking/Toileting schedule using arm’s reach rule for commode and bathroom/Use of alarms - bed, chair and/or voice tab/Visual Cue: Yellow wristband/Bed in lowest position, wheels locked, appropriate side rails in place/Call bell, personal items and telephone in reach/Instruct patient to call for assistance before getting out of bed or chair/Non-slip footwear when patient is out of bed/[place] to call system/Physically safe environment - no spills, clutter or unnecessary equipment/Purposeful Proactive Rounding/Room/bathroom lighting operational, light cord in reach

## 2024-08-28 NOTE — ED ADULT NURSE NOTE - NSICDXPASTMEDICALHX_GEN_ALL_CORE_FT
PAST MEDICAL HISTORY:  Anxiety and depression     Chronic GERD      Area M Indication Text: Tumors in this location are included in Area M (cheek, forehead, scalp, neck, jawline and pretibial skin).  Mohs surgery is indicated for tumors in these anatomic locations.

## 2024-08-28 NOTE — ED PROVIDER NOTE - PHYSICAL EXAMINATION
VITAL SIGNS: I have reviewed nursing notes and confirm.   GEN: Well-developed; well-nourished; in mild acute distress. (+) tremors, tongue fasiculations. Speaking full sentences.   SKIN: Warm, pink, no rash, no diaphoresis, no cyanosis, well perfused.   HEAD: Normocephalic; atraumatic.    NECK: Supple; non tender.   EYES: Pupils 3mm equal, round, reactive to light and accomodation, conjunctiva and sclera clear   ENT: No nasal discharge; airway clear. Trachea is midline.    CV: RRR. S1, S2 normal; no murmurs, gallops, or rubs. Capillary refill < 2 seconds throughout. Distal pulses intact 2+ throughout.  RESP: CTA bilaterally. No wheezes, rales, or rhonchi.   ABD: Normal bowel sounds, soft, non-distended, non-tender, no rebound, no guarding, no rigidity   MSK: Normal range of motion and movement of all 4 extremities.    BACK: No thoracolumbar midline or paravertebral tenderness.    NEURO: Alert & oriented x 3, Grossly unremarkable. Sensory and motor intact throughout. No focal deficits.  Normal speech and coordination.

## 2024-08-28 NOTE — H&P ADULT - NSHPLABSRESULTS_GEN_ALL_CORE
10.3   5.65  )-----------( 366      ( 28 Aug 2024 18:00 )             30.1       08-28    140  |  104  |  16  ----------------------------<  72  4.8   |  21<L>  |  0.75    Ca    9.5      28 Aug 2024 17:25    TPro  7.9  /  Alb  3.5  /  TBili  0.8  /  DBili  x   /  AST  38  /  ALT  22  /  AlkPhos  106  08-28         LIVER FUNCTIONS - ( 28 Aug 2024 17:25 )  Alb: 3.5 g/dL / Pro: 7.9 g/dL / ALK PHOS: 106 U/L / ALT: 22 U/L / AST: 38 U/L / GGT: x             Lipase: 25 U/L (08-28-24 @ 17:25)              Urinalysis Basic - ( 28 Aug 2024 17:25 )    Color: x / Appearance: x / SG: x / pH: x  Gluc: 72 mg/dL / Ketone: x  / Bili: x / Urobili: x   Blood: x / Protein: x / Nitrite: x   Leuk Esterase: x / RBC: x / WBC x   Sq Epi: x / Non Sq Epi: x / Bacteria: x        CAPILLARY BLOOD GLUCOSE            EKG: personally rev. NSR at 85bpm no acute st changes

## 2024-08-28 NOTE — ED ADULT NURSE NOTE - CADM POA CENTRAL LINE
[FreeTextEntry1] : 46 year old female presents for an annual. Pt is doing well. States she has been getting annuals with another GYN office due to her insurance switching. Reports not getting a period since s/p c/s 12/30/19, denies menopausal symptoms.  Denies any cyclical symptoms.  OBHx: c/s (12/30/19 - F - [id number] - AG) GYNHx: endometriosis  PSHx: LSC removal of endometriosis(2008), xlap L ovarian cystectomy(2012), s/p LSC bilateral salpingectomy, SAUL on 06/16/17 No
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
## 2024-08-28 NOTE — H&P ADULT - NSHPPHYSICALEXAM_GEN_ALL_CORE
Vital Signs Last 24 Hrs  T(C): 36.4 (28 Aug 2024 16:30), Max: 36.4 (28 Aug 2024 16:30)  T(F): 97.6 (28 Aug 2024 16:30), Max: 97.6 (28 Aug 2024 16:30)  HR: 92 (28 Aug 2024 19:24) (92 - 100)  BP: 131/78 (28 Aug 2024 19:24) (127/73 - 131/78)  BP(mean): --  RR: 16 (28 Aug 2024 19:24) (16 - 17)  SpO2: 97% (28 Aug 2024 19:24) (97% - 97%)    Parameters below as of 28 Aug 2024 19:24  Patient On (Oxygen Delivery Method): room air      Daily Height in cm: 165.1 (28 Aug 2024 16:30)    Daily   CAPILLARY BLOOD GLUCOSE        I&O's Summary      GENERAL: NAD  HEAD:  Normocephalic  EYES: EOMI, PERRLA, conjunctiva and sclera clear  ENMT: No tonsillar erythema, exudates, or enlargement; Moist mucous membranes, No lesions  NECK: Supple, No JVD, no bruit, normal thyroid  NERVOUS SYSTEM:  Alert & Oriented X3, Good concentration; grossly  moves all fours.   CHEST/LUNG: Clear to auscultation bilaterally; No rales, rhonchi, wheezing, or rubs  HEART: Regular rate and rhythm; No murmurs, rubs, or gallops  ABDOMEN: Soft, Nontender, Nondistended; Bowel sounds present  EXTREMITIES:  2+ Peripheral Pulses, No clubbing, cyanosis, or edema  LYMPH: No lymphadenopathy noted  SKIN: No rashes or lesions. small scape on L elbow.

## 2024-08-28 NOTE — PATIENT PROFILE ADULT - FALL HARM RISK - FACTORS
CIWA protocol initiation less than 96 hours/IV and/or equipment tethered to patient/Medication side effects

## 2024-08-28 NOTE — ED ADULT NURSE NOTE - NS TRANSFER PATIENT BELONGINGS
Clothing phone, laptop, clothing placed in ED lobby closet, (4 bags) pt did not  want anything locked in security safe/Cell Phone/PDA (specify)/Clothing

## 2024-08-28 NOTE — ED PROVIDER NOTE - CLINICAL SUMMARY MEDICAL DECISION MAKING FREE TEXT BOX
Dr. Tylor Davis MD, EM [facility] Medical Toxicology Attendin-year-old female with a past medical history of alcohol dependence presenting with alcohol withdrawal today.  Her last drink was yesterday.  She drinks daily multiple bottles of vodka.  She was sober until a week ago when she began binge drinking.  Today she is feeling mild tremors and shakiness and anxiety.  Otherwise she is requesting detox.  She denies any SI or HI.  Denies any chest pain, abdominal pain, shortness of breath, nausea/vomiting, headaches, fevers, chills,  weakness, syncope, hematuria, dysuria, urinary symptoms, subjective neurological deficits.   History obtained from independent historian: N/A  External note reviewed: N/A  Decision making, ED course, independent interpretation of imaging studies, and consults:   Patient with hx of  withdrawal seizures,  no delirium tremens/prior ICU admits presentation of mild-moderate ETOH withdrawal. Currently awake, alert, hemodynamically stable.   DDx includes but is not limited to: Wernicke's encephalopathy 2/2 thiamine deficiency  (i.e. ophthalmoplegia/nystagmus, ataxia, confusion/memory impairment), Korsakoff's Psychosis 2/2 thiamine deficiency, Wernicke-Korsakoff's syndrome, magnesium deficiency, delirium tremens (i.e. delirium/AMS, global confusion, agitation, autonomic hyperactivity: diaphoresis, tachycardia, tachypnea, hypertension, hyperthermia), alcoholic ketoacidosis; but unlikely given the HPI and exam. FSG normal.  - CBC, CMP,  Lipase,    APAP, ASA, ETOH; Cardiac monitoring for autonomic hyperactivity.  - IVPB 1-2g MgSO4 PRN hypomagnesemic state, electrolyte repletion PRN  -  NO suicidal ideations,    - CIWA Symptom Triggered: For Mild ETOH W/D: Escalating doses of Diazepam 10-40 mg IVP q5-10min (fast onset, w/ rapid peak effect 2-3 min, more lipophilic) until desired reduction in psychomotor agitation or RASS 0 to -1 or per CIWA protocol. For Severe BDZ-resistant ETOH W/D w/o hepatic ecephalopathy, HAART medications, hx of acute intermittent porphyria: Consider Phenobarbital 130-260mg [id number] PRN and intubation/propofol infusion.     Consideration hospitalization vs de-escalation of care: admit  Disposition:  ADMIT

## 2024-08-28 NOTE — ED PROVIDER NOTE - OBJECTIVE STATEMENT
44-year-old female with a past medical history of alcohol dependence presenting with alcohol withdrawal today.  Her last drink was yesterday.  She drinks daily multiple bottles of vodka.  She was sober until a week ago when she began binge drinking.  Today she is feeling mild tremors and shakiness and anxiety.  Otherwise she is requesting detox.  She denies any SI or HI.    Denies any chest pain, abdominal pain, shortness of breath, nausea/vomiting, headaches, fevers, chills,  weakness, syncope, hematuria, dysuria, urinary symptoms, subjective neurological deficits.

## 2024-08-28 NOTE — H&P ADULT - NSHPREVIEWOFSYSTEMS_GEN_ALL_CORE
CONSTITUTIONAL: No fever, weight loss, or fatigue  EYES: No eye pain, visual disturbances, or discharge  ENMT:  No difficulty hearing, tinnitus, vertigo; No sinus or throat pain  NECK: No pain or stiffness  RESPIRATORY: No cough, wheezing, chills or hemoptysis; No shortness of breath  CARDIOVASCULAR: No chest pain, palpitations, dizziness, or leg swelling  GASTROINTESTINAL: No abdominal or epigastric pain. No nausea, vomiting, or hematemesis; + diarrhea  No melena or hematochezia.  GENITOURINARY: No dysuria, frequency, hematuria, or incontinence  NEUROLOGICAL:+ headaches, no memory loss, loss of strength, numbness, or tremors  SKIN: No itching, burning, rashes, or lesions   LYMPH NODES: No enlarged glands  ENDOCRINE: No heat or cold intolerance; No hair loss  MUSCULOSKELETAL: No joint pain or swelling; No muscle, back, or extremity pain  PSYCHIATRIC: No depression, anxiety, mood swings, or difficulty sleeping  HEME/LYMPH: No easy bruising, or bleeding gums  ALLERY AND IMMUNOLOGIC: No hives or eczema    IMPROVE VTE Individual Risk Assessment          RISK                                                          Points  [  ] Previous VTE                                                3  [  ] Thrombophilia                                             2  [  ] Lower limb paralysis                                   2        (unable to hold up >15 seconds)    [  ] Current Cancer                                             2         (within 6 months)  [  ] Immobilization > 24 hrs                              1  [  ] ICU/CCU stay > 24 hours                             1  [  ] Age > 60                                                         1    IMPROVE VTE Score:         [  0      ]    Total Risk Factor Score:    0 - 1:   Consider IPC  >2 - 3:  Thromboprophylaxis required (enoxaparin or SQ heparin)        >4:   High Risk: Thromboprophylaxis required (enoxaparin or SQ heparin), optional add IPC  **If CONTRAINDICATION to enoxaparin or SQ heparin, USE IPCs**

## 2024-08-28 NOTE — ED ADULT NURSE NOTE - PLAN OF CARE
Breathing spontaneous and unlabored. Breath sounds clear and equal bilaterally with regular rhythm. Call bell/Explanation of exam/test/Position of comfort

## 2024-08-28 NOTE — PATIENT PROFILE ADULT - NSPROEXTENSIONSOFSELF_GEN_A_NUR
4 bags of belongings in closet, including clothing, phone, laptop, wallet, shoes, ring and watch/eyeglasses

## 2024-08-28 NOTE — H&P ADULT - ASSESSMENT
44F hx of depression (self D/C all meds), GERD, hx of R breast lumpectomy (sec to benign tumor), longstanding ETOH use presenting with ETOH withdrawal.    # ETOH withdrawal.   CIWA with standing ativan  MVI, folate, thiamine  PPI  monitor lytes and replete  IVFs overnight.     #GERD  cont PPI.     [facility] rev.   D/W ED Dr Davis who is also . No specific monitoring for ingestion of hand .  44F hx of depression (self D/C all meds), GERD, hx of R breast lumpectomy (sec to benign tumor), longstanding ETOH use presenting with ETOH withdrawal.    # ETOH withdrawal.   CIWA with standing ativan  MVI, folate, thiamine  PPI  monitor lytes and replete  IVFs overnight.     #Anemia  anemia labs  check stool guaiac    #GERD  cont PPI.     [facility] rev.   D/W ED Dr Davis who is also . No specific monitoring for ingestion of hand .

## 2024-08-28 NOTE — CHART NOTE - NSCHARTNOTEFT_GEN_A_CORE
Search Terms: leyla nieto, 1980Search Date: 08/28/2024 21:18:16 PM  The Drug Utilization Report below displays all of the controlled substance prescriptions, if any, that your patient has filled in the last twelve months. The information displayed on this report is compiled from pharmacy submissions to the Department, and accurately reflects the information as submitted by the pharmacies.    This report was requested by: Vilma Ventura | Reference #: [id number]    There are no results for the search terms that you entered.

## 2024-08-29 LAB
ALBUMIN SERPL ELPH-MCNC: 3 G/DL — LOW (ref 3.3–5)
ALP SERPL-CCNC: 94 U/L — SIGNIFICANT CHANGE UP (ref 40–120)
ALT FLD-CCNC: 22 U/L — SIGNIFICANT CHANGE UP (ref 10–45)
ANION GAP SERPL CALC-SCNC: 12 MMOL/L — SIGNIFICANT CHANGE UP (ref 5–17)
AST SERPL-CCNC: 28 U/L — SIGNIFICANT CHANGE UP (ref 10–40)
BASOPHILS # BLD AUTO: 0.03 K/UL — SIGNIFICANT CHANGE UP (ref 0–0.2)
BASOPHILS NFR BLD AUTO: 0.6 % — SIGNIFICANT CHANGE UP (ref 0–2)
BILIRUB SERPL-MCNC: 0.8 MG/DL — SIGNIFICANT CHANGE UP (ref 0.2–1.2)
BUN SERPL-MCNC: 18 MG/DL — SIGNIFICANT CHANGE UP (ref 7–23)
CALCIUM SERPL-MCNC: 9 MG/DL — SIGNIFICANT CHANGE UP (ref 8.4–10.5)
CHLORIDE SERPL-SCNC: 106 MMOL/L — SIGNIFICANT CHANGE UP (ref 96–108)
CO2 SERPL-SCNC: 23 MMOL/L — SIGNIFICANT CHANGE UP (ref 22–31)
CREAT SERPL-MCNC: 0.72 MG/DL — SIGNIFICANT CHANGE UP (ref 0.5–1.3)
EGFR: 107 ML/MIN/1.73M2 — SIGNIFICANT CHANGE UP
EOSINOPHIL # BLD AUTO: 0.03 K/UL — SIGNIFICANT CHANGE UP (ref 0–0.5)
EOSINOPHIL NFR BLD AUTO: 0.6 % — SIGNIFICANT CHANGE UP (ref 0–6)
ETHANOL SERPL-MCNC: <3 MG/DL — SIGNIFICANT CHANGE UP (ref 0–3)
FERRITIN SERPL-MCNC: 116 NG/ML — SIGNIFICANT CHANGE UP (ref 15–150)
FOLATE SERPL-MCNC: 15.4 NG/ML — SIGNIFICANT CHANGE UP
GLUCOSE SERPL-MCNC: 77 MG/DL — SIGNIFICANT CHANGE UP (ref 70–99)
HAPTOGLOB SERPL-MCNC: 114 MG/DL — SIGNIFICANT CHANGE UP (ref 34–200)
HCT VFR BLD CALC: 30.7 % — LOW (ref 34.5–45)
HGB BLD-MCNC: 10.1 G/DL — LOW (ref 11.5–15.5)
IMM GRANULOCYTES NFR BLD AUTO: 0.2 % — SIGNIFICANT CHANGE UP (ref 0–0.9)
IRON SATN MFR SERPL: 277 UG/DL — HIGH (ref 30–160)
LYMPHOCYTES # BLD AUTO: 1.21 K/UL — SIGNIFICANT CHANGE UP (ref 1–3.3)
LYMPHOCYTES # BLD AUTO: 24.9 % — SIGNIFICANT CHANGE UP (ref 13–44)
MAGNESIUM SERPL-MCNC: 1.8 MG/DL — SIGNIFICANT CHANGE UP (ref 1.6–2.6)
MCHC RBC-ENTMCNC: 29.1 PG — SIGNIFICANT CHANGE UP (ref 27–34)
MCHC RBC-ENTMCNC: 32.9 GM/DL — SIGNIFICANT CHANGE UP (ref 32–36)
MCV RBC AUTO: 88.5 FL — SIGNIFICANT CHANGE UP (ref 80–100)
MONOCYTES # BLD AUTO: 0.44 K/UL — SIGNIFICANT CHANGE UP (ref 0–0.9)
MONOCYTES NFR BLD AUTO: 9.1 % — SIGNIFICANT CHANGE UP (ref 2–14)
NEUTROPHILS # BLD AUTO: 3.14 K/UL — SIGNIFICANT CHANGE UP (ref 1.8–7.4)
NEUTROPHILS NFR BLD AUTO: 64.6 % — SIGNIFICANT CHANGE UP (ref 43–77)
NRBC # BLD: 0 /100 WBCS — SIGNIFICANT CHANGE UP (ref 0–0)
OB PNL STL: NEGATIVE — SIGNIFICANT CHANGE UP
PHOSPHATE SERPL-MCNC: 3.6 MG/DL — SIGNIFICANT CHANGE UP (ref 2.5–4.5)
PLATELET # BLD AUTO: 350 K/UL — SIGNIFICANT CHANGE UP (ref 150–400)
POTASSIUM SERPL-MCNC: 3.9 MMOL/L — SIGNIFICANT CHANGE UP (ref 3.5–5.3)
POTASSIUM SERPL-SCNC: 3.9 MMOL/L — SIGNIFICANT CHANGE UP (ref 3.5–5.3)
PROT SERPL-MCNC: 7.1 G/DL — SIGNIFICANT CHANGE UP (ref 6–8.3)
RBC # BLD: 3.47 M/UL — LOW (ref 3.8–5.2)
RBC # FLD: 13.5 % — SIGNIFICANT CHANGE UP (ref 10.3–14.5)
SODIUM SERPL-SCNC: 141 MMOL/L — SIGNIFICANT CHANGE UP (ref 135–145)
VIT B12 SERPL-MCNC: 436 PG/ML — SIGNIFICANT CHANGE UP (ref 232–1245)
WBC # BLD: 4.86 K/UL — SIGNIFICANT CHANGE UP (ref 3.8–10.5)
WBC # FLD AUTO: 4.86 K/UL — SIGNIFICANT CHANGE UP (ref 3.8–10.5)

## 2024-08-29 PROCEDURE — 99233 SBSQ HOSP IP/OBS HIGH 50: CPT | Mod: GC

## 2024-08-29 RX ORDER — MAGNESIUM, ALUMINUM HYDROXIDE 200-225/5
30 SUSPENSION, ORAL (FINAL DOSE FORM) ORAL ONCE
Refills: 0 | Status: COMPLETED | OUTPATIENT
Start: 2024-08-29 | End: 2024-08-29

## 2024-08-29 RX ORDER — LORAZEPAM 4 MG/ML
2 INJECTION INTRAMUSCULAR; INTRAVENOUS
Refills: 0 | Status: DISCONTINUED | OUTPATIENT
Start: 2024-08-29 | End: 2024-09-01

## 2024-08-29 RX ADMIN — ACETAMINOPHEN 650 MILLIGRAM(S): 325 TABLET ORAL at 18:16

## 2024-08-29 RX ADMIN — LORAZEPAM 2 MILLIGRAM(S): 4 INJECTION INTRAMUSCULAR; INTRAVENOUS at 02:01

## 2024-08-29 RX ADMIN — LORAZEPAM 2 MILLIGRAM(S): 4 INJECTION INTRAMUSCULAR; INTRAVENOUS at 11:20

## 2024-08-29 RX ADMIN — Medication 1 PATCH: at 05:21

## 2024-08-29 RX ADMIN — Medication 40 MILLIGRAM(S): at 06:30

## 2024-08-29 RX ADMIN — Medication 100 MILLIGRAM(S): at 11:08

## 2024-08-29 RX ADMIN — Medication 30 MILLILITER(S): at 17:17

## 2024-08-29 RX ADMIN — Medication 1 PATCH: at 07:46

## 2024-08-29 RX ADMIN — ACETAMINOPHEN 650 MILLIGRAM(S): 325 TABLET ORAL at 05:21

## 2024-08-29 RX ADMIN — ACETAMINOPHEN 650 MILLIGRAM(S): 325 TABLET ORAL at 11:08

## 2024-08-29 RX ADMIN — LORAZEPAM 2 MILLIGRAM(S): 4 INJECTION INTRAMUSCULAR; INTRAVENOUS at 20:30

## 2024-08-29 RX ADMIN — ENOXAPARIN SODIUM 40 MILLIGRAM(S): 100 INJECTION SUBCUTANEOUS at 05:22

## 2024-08-29 RX ADMIN — Medication 1 MILLIGRAM(S): at 11:08

## 2024-08-29 RX ADMIN — Medication 1 TABLET(S): at 11:08

## 2024-08-29 RX ADMIN — ACETAMINOPHEN 650 MILLIGRAM(S): 325 TABLET ORAL at 12:08

## 2024-08-29 RX ADMIN — ACETAMINOPHEN 650 MILLIGRAM(S): 325 TABLET ORAL at 17:16

## 2024-08-29 RX ADMIN — LORAZEPAM 2 MILLIGRAM(S): 4 INJECTION INTRAMUSCULAR; INTRAVENOUS at 05:21

## 2024-08-29 RX ADMIN — LORAZEPAM 2 MILLIGRAM(S): 4 INJECTION INTRAMUSCULAR; INTRAVENOUS at 16:00

## 2024-08-29 RX ADMIN — ACETAMINOPHEN 650 MILLIGRAM(S): 325 TABLET ORAL at 06:21

## 2024-08-29 NOTE — PROGRESS NOTE ADULT - ASSESSMENT
44F hx of depression (self D/C all meds), GERD, hx of R breast lumpectomy (sec to benign tumor), longstanding ETOH use presenting with ETOH withdrawal.    # ETOH withdrawal.   CIWA with standing ativan  MVI, folate, thiamine  PPI  monitor lytes and replete  IVFs overnight.     #Anemia  anemia labs  check stool guaiac    #GERD  cont PPI.     [facility] rev.   D/W ED Dr Davis who is also . No specific monitoring for ingestion of hand .  44F hx of depression (self D/C all meds), GERD, hx of R breast lumpectomy (sec to benign tumor), longstanding ETOH use presenting with ETOH withdrawal.    # ETOH withdrawal.   - Ativan 2 mg PRN  - Multivitamin qd   - Thiamine 100 mg qd  - folic acid 1mg qd  - protonix 40 mg wd  - monitor CMP   - monitor alcohol level 99 -> <3    #Anemia  - 10.3/30.1 -> 10.1/30.7  - iron studies f/u ferritin, tibc, haptoglobin     #GERD  cont PPI.    discussed with dr. hussein  44F hx of depression (self D/C all meds), GERD, hx of R breast lumpectomy (sec to benign tumor), longstanding ETOH use presenting with ETOH withdrawal.    # ETOH withdrawal.   - Ativan 2 mg PRN  - Multivitamin qd   - Thiamine 100 mg qd  - folic acid 1mg qd  - tylenol 650 mg q6   - monitor CMP   - monitor alcohol level 99 -> <3  - SBIRT reccomendations Pt advised [facility] to work with prior Counselor and consider additional forms of treatment.    #Anemia  - 10.3/30.1 -> 10.1/30.7  - iron studies f/u ferritin, tibc, haptoglobin     #GERD  - protonix 40 mg qd    discussed with dr. hussein    44F Pmhx of depression,  GERD, ETOH use admitted for  ETOH withdrawal.     # ETOH withdrawal  - Pt was in detox program 2 weeks ago, relapsed 1 wk ago, drinks 1L or more of Vodka a day  -  Last drink on 08/27, 08/28 am pt was having tremors, shakes, anxiety so pt drank portable hand   -No history of DTs or seizures, last admission for ETOH 07/2024  - CIWA symptom trigger Ativan 2 mg PRN  - Multivitamin qd   - Thiamine 100 mg qd  - folic acid 1mg qd  - tylenol 650 mg q6   - monitor CMP   - monitor alcohol level 99 -> <3    #Normocytic Anemia  - 10.3/30.1 -> 10.1/30.7  - iron studies f/u ferritin, tibc, haptoglobin     #GERD  - protonix 40 mg qd    #Dispo  - SBIRT reccomendations Pt advised to return to [facility] to work with prior Counselor and consider additional forms of treatment.    #DVT prophylaxis  -lovenox 40 mg subq    discussed with dr. hussein

## 2024-08-29 NOTE — PROGRESS NOTE ADULT - SUBJECTIVE AND OBJECTIVE BOX
Patient is a 44y old  Female who presents with a chief complaint of alcohol withdrawal (29 Aug 2024 10:05)      INTERVAL HPI/OVERNIGHT EVENTS: Patient seen and examined at bedside. Pt states headaches and diarrhea, but overall feels "ok"    MEDICATIONS  (STANDING):  enoxaparin Injectable 40 milliGRAM(s) SubCutaneous every 24 hours  folic acid 1 milliGRAM(s) Oral daily  lactated ringers. 1000 milliLiter(s) (100 mL/Hr) IV Continuous <Continuous>  multivitamin 1 Tablet(s) Oral daily  pantoprazole    Tablet 40 milliGRAM(s) Oral before breakfast  thiamine 100 milliGRAM(s) Oral daily    MEDICATIONS  (PRN):  acetaminophen     Tablet .. 650 milliGRAM(s) Oral every 6 hours PRN Mild Pain (1 - 3)  aluminum hydroxide/magnesium hydroxide/simethicone Suspension 30 milliLiter(s) Oral once PRN Dyspepsia  LORazepam     Tablet 2 milliGRAM(s) Oral every 2 hours PRN Symptom-triggered 2 point increase in CIWA-Ar      Allergies    Keflex (Anaphylaxis)    Intolerances        REVIEW OF SYSTEMS:  CONSTITUTIONAL: No fever or chills  HEENT:  No headache, no sore throat  RESPIRATORY: No cough, wheezing, or shortness of breath  CARDIOVASCULAR: No chest pain, palpitations  GASTROINTESTINAL: diarrhea x2, no abd pain, nausea, vomiting,  GENITOURINARY: No dysuria, frequency, or hematuria  NEUROLOGICAL: + headaches, no focal weakness or dizziness  MUSCULOSKELETAL: no myalgias     Vital Signs Last 24 Hrs  T(C): 36.5 (29 Aug 2024 12:23), Max: 36.7 (28 Aug 2024 22:40)  T(F): 97.7 (29 Aug 2024 12:23), Max: 98 (28 Aug 2024 22:40)  HR: 91 (29 Aug 2024 12:23) (80 - 92)  BP: 130/84 (29 Aug 2024 12:23) (125/74 - 131/78)  BP(mean): 94 (29 Aug 2024 05:26) (94 - 94)  RR: 17 (29 Aug 2024 12:23) (15 - 17)  SpO2: 97% (29 Aug 2024 12:23) (95% - 98%)    Parameters below as of 29 Aug 2024 12:23  Patient On (Oxygen Delivery Method): room air      I&O's Summary    BMI (kg/m2): 25 (08-28-24 @ 22:40)    PHYSICAL EXAM:  GENERAL: NAD  HEENT:  AT/NC, moist mucous membranes, tongue fasciculations,  EOMI, conjunctiva and sclera clear  RESPIR:  CTA b/l, no rales, wheezes, or rhonchi,  normal respiratory effort, no intercostal retractions  HEART:  RRR, S1, S2, no murmurs; no pitting edema  ABDOMEN:  BS+, soft, nontender, nondistended; No HSM  MSK/EXTREMITIES: mild tremor bilateral extremities, 2+ peripheral pulses, no clubbing or cyanosis  NERVOUS SYSTEM: answers questions and follows commands appropriately, A&Ox3 grossly moves all extremities   SKIN: warm, well perfused, approprate for age, no visible lesions   PSYCH: Appropriate affect, Alert & Awake;      LABS: Personally reviewed  CBC                        10.1   4.86  )-----------( 350      ( 29 Aug 2024 07:00 )             30.7     CMP  08-29    141  |  106  |  18  ----------------------------<  77  3.9   |  23  |  0.72    Ca    9.0      29 Aug 2024 07:00  Phos  3.6     08-29  Mg     1.8     08-29    TPro  7.1  /  Alb  3.0  /  TBili  0.8  /  DBili  x   /  AST  28  /  ALT  22  /  AlkPhos  94  08-29      Lipase: 25 U/L (08-28-24 @ 17:25)                                  Urinalysis Basic - ( 29 Aug 2024 07:00 )    Color: x / Appearance: x / SG: x / pH: x  Gluc: 77 mg/dL / Ketone: x  / Bili: x / Urobili: x   Blood: x / Protein: x / Nitrite: x   Leuk Esterase: x / RBC: x / WBC x   Sq Epi: x / Non Sq Epi: x / Bacteria: x                RADIOLOGY & ADDITIONAL TESTS: Personally reviewed.       Consultant(s) Notes Reviewed:  [x] YES  [ ] NO   Discussed with SUPRIYA/SHERLY, RN

## 2024-08-29 NOTE — SBIRT NOTE ADULT - [facility]GR[facility]  [phone] [place] [address], [address] Floor,  [place] [address] [phone]

## 2024-08-29 NOTE — SBIRT NOTE ADULT - NSSBIRTALCACTIVEREFTXDET_GEN_A_CORE
Pt advised to return to [facility] to work with prior Counselor and consider additional forms of treatment.

## 2024-08-30 DIAGNOSIS — F41.1 GENERALIZED ANXIETY DISORDER: ICD-10-CM

## 2024-08-30 DIAGNOSIS — F19.94 OTHER PSYCHOACTIVE SUBSTANCE USE, UNSPECIFIED WITH PSYCHOACTIVE SUBSTANCE-INDUCED MOOD DISORDER: ICD-10-CM

## 2024-08-30 LAB
ALBUMIN SERPL ELPH-MCNC: 2.7 G/DL — LOW (ref 3.3–5)
ALP SERPL-CCNC: 98 U/L — SIGNIFICANT CHANGE UP (ref 40–120)
ALT FLD-CCNC: 34 U/L — SIGNIFICANT CHANGE UP (ref 10–45)
ANION GAP SERPL CALC-SCNC: 10 MMOL/L — SIGNIFICANT CHANGE UP (ref 5–17)
AST SERPL-CCNC: 42 U/L — HIGH (ref 10–40)
BILIRUB SERPL-MCNC: 0.4 MG/DL — SIGNIFICANT CHANGE UP (ref 0.2–1.2)
BUN SERPL-MCNC: 14 MG/DL — SIGNIFICANT CHANGE UP (ref 7–23)
CALCIUM SERPL-MCNC: 9.2 MG/DL — SIGNIFICANT CHANGE UP (ref 8.4–10.5)
CHLORIDE SERPL-SCNC: 106 MMOL/L — SIGNIFICANT CHANGE UP (ref 96–108)
CO2 SERPL-SCNC: 20 MMOL/L — LOW (ref 22–31)
CREAT SERPL-MCNC: 0.65 MG/DL — SIGNIFICANT CHANGE UP (ref 0.5–1.3)
EGFR: 112 ML/MIN/1.73M2 — SIGNIFICANT CHANGE UP
GLUCOSE SERPL-MCNC: 85 MG/DL — SIGNIFICANT CHANGE UP (ref 70–99)
POTASSIUM SERPL-MCNC: 4.3 MMOL/L — SIGNIFICANT CHANGE UP (ref 3.5–5.3)
POTASSIUM SERPL-SCNC: 4.3 MMOL/L — SIGNIFICANT CHANGE UP (ref 3.5–5.3)
PROT SERPL-MCNC: 7.1 G/DL — SIGNIFICANT CHANGE UP (ref 6–8.3)
SODIUM SERPL-SCNC: 136 MMOL/L — SIGNIFICANT CHANGE UP (ref 135–145)

## 2024-08-30 PROCEDURE — 90792 PSYCH DIAG EVAL W/MED SRVCS: CPT

## 2024-08-30 PROCEDURE — 99233 SBSQ HOSP IP/OBS HIGH 50: CPT | Mod: GC

## 2024-08-30 RX ORDER — HYDROXYZINE HCL 25 MG
25 TABLET ORAL DAILY
Refills: 0 | Status: DISCONTINUED | OUTPATIENT
Start: 2024-08-30 | End: 2024-09-02

## 2024-08-30 RX ORDER — ACAMPROSATE CALCIUM 333 MG/1
666 TABLET, DELAYED RELEASE ORAL THREE TIMES A DAY
Refills: 0 | Status: DISCONTINUED | OUTPATIENT
Start: 2024-08-30 | End: 2024-08-31

## 2024-08-30 RX ADMIN — LORAZEPAM 2 MILLIGRAM(S): 4 INJECTION INTRAMUSCULAR; INTRAVENOUS at 21:16

## 2024-08-30 RX ADMIN — Medication 1 TABLET(S): at 14:23

## 2024-08-30 RX ADMIN — Medication 6 MILLIGRAM(S): at 01:24

## 2024-08-30 RX ADMIN — ENOXAPARIN SODIUM 40 MILLIGRAM(S): 100 INJECTION SUBCUTANEOUS at 07:01

## 2024-08-30 RX ADMIN — ACETAMINOPHEN 650 MILLIGRAM(S): 325 TABLET ORAL at 10:15

## 2024-08-30 RX ADMIN — ACETAMINOPHEN 650 MILLIGRAM(S): 325 TABLET ORAL at 09:43

## 2024-08-30 RX ADMIN — LORAZEPAM 2 MILLIGRAM(S): 4 INJECTION INTRAMUSCULAR; INTRAVENOUS at 09:40

## 2024-08-30 RX ADMIN — ACETAMINOPHEN 650 MILLIGRAM(S): 325 TABLET ORAL at 20:56

## 2024-08-30 RX ADMIN — ACAMPROSATE CALCIUM 666 MILLIGRAM(S): 333 TABLET, DELAYED RELEASE ORAL at 21:16

## 2024-08-30 RX ADMIN — Medication 1 PATCH: at 14:25

## 2024-08-30 RX ADMIN — Medication 1 MILLIGRAM(S): at 14:23

## 2024-08-30 RX ADMIN — Medication 6 MILLIGRAM(S): at 21:15

## 2024-08-30 RX ADMIN — LORAZEPAM 2 MILLIGRAM(S): 4 INJECTION INTRAMUSCULAR; INTRAVENOUS at 01:34

## 2024-08-30 RX ADMIN — Medication 100 MILLIGRAM(S): at 14:24

## 2024-08-30 RX ADMIN — Medication 40 MILLIGRAM(S): at 07:01

## 2024-08-30 RX ADMIN — ACETAMINOPHEN 650 MILLIGRAM(S): 325 TABLET ORAL at 01:54

## 2024-08-30 RX ADMIN — ACETAMINOPHEN 650 MILLIGRAM(S): 325 TABLET ORAL at 01:24

## 2024-08-30 RX ADMIN — ACETAMINOPHEN 650 MILLIGRAM(S): 325 TABLET ORAL at 21:26

## 2024-08-30 NOTE — BH CONSULTATION LIAISON ASSESSMENT NOTE - CURRENT MEDICATION
MEDICATIONS  (STANDING):  enoxaparin Injectable 40 milliGRAM(s) SubCutaneous every 24 hours  folic acid 1 milliGRAM(s) Oral daily  lactated ringers. 1000 milliLiter(s) (100 mL/Hr) IV Continuous <Continuous>  multivitamin 1 Tablet(s) Oral daily  pantoprazole    Tablet 40 milliGRAM(s) Oral before breakfast  thiamine 100 milliGRAM(s) Oral daily    MEDICATIONS  (PRN):  acetaminophen     Tablet .. 650 milliGRAM(s) Oral every 6 hours PRN Mild Pain (1 - 3)  hydrOXYzine hydrochloride 25 milliGRAM(s) Oral daily PRN Anxiety  LORazepam     Tablet 2 milliGRAM(s) Oral every 2 hours PRN Symptom-triggered 2 point increase in CIWA-Ar  melatonin 6 milliGRAM(s) Oral at bedtime PRN Insomnia
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
## 2024-08-30 NOTE — BH CONSULTATION LIAISON ASSESSMENT NOTE - NSBHSACONSEQUENCE_PSY_A_CORE FT
[facility] [phone] for Roll[facility] to be delivered to bedside
Patient reports multiple detox and substance use rehab treatments in the past.

## 2024-08-30 NOTE — BH CONSULTATION LIAISON ASSESSMENT NOTE - NSBHCONSULTRECOMMENDOTHER_PSY_A_CORE FT
- Continue with Ativan 2mg IV Push CIWA-Ar.  - Patient does not want antidepressants or anxiolytics at this time.   - Patient endorses nicotine withdrawals. Recommend starting a nicotine patch (14mg/24hrs)  - Patient is open to a trial of Acamprosate 666mg for ongoing cravings/urges to consume alcohol. Not formulary at [facility] - can be explored at inpatient substance use rehab.
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
## 2024-08-30 NOTE — BH CONSULTATION LIAISON ASSESSMENT NOTE - NSACTIVEVENT_PSY_ALL_CORE
Triggering events leading to humiliation, shame, and/or despair (e.g., Loss of relationship, financial or health status) (real or anticipated)/Legal problems/Substance intoxication or withdrawal/Pending incarceration or homelessness

## 2024-08-30 NOTE — BH CONSULTATION LIAISON ASSESSMENT NOTE - NSBHCHARTREVIEWVS_PSY_A_CORE FT
Vital Signs Last 24 Hrs  T(C): 36.9 (30 Aug 2024 05:19), Max: 36.9 (30 Aug 2024 05:19)  T(F): 98.5 (30 Aug 2024 05:19), Max: 98.5 (30 Aug 2024 05:19)  HR: 69 (30 Aug 2024 05:19) (69 - 98)  BP: 124/70 (30 Aug 2024 05:19) (122/83 - 124/70)  BP(mean): --  RR: 18 (30 Aug 2024 05:19) (18 - 18)  SpO2: 99% (30 Aug 2024 05:19) (97% - 99%)    Parameters below as of 30 Aug 2024 05:19  Patient On (Oxygen Delivery Method): room air

## 2024-08-30 NOTE — BH CONSULTATION LIAISON ASSESSMENT NOTE - NSBHCHARTREVIEWLAB_PSY_A_CORE FT
Complete Blood Count + Automated Diff in AM (08.29.24 @ 07:00)   WBC Count: 4.86 K/uL  RBC Count: 3.47 M/uL  Hemoglobin: 10.1 g/dL  Hematocrit: 30.7 %  Mean Cell Volume: 88.5 fl  Mean Cell Hemoglobin: 29.1 pg  Mean Cell Hemoglobin Conc: 32.9 gm/dL  Red Cell Distrib Width: 13.5 %  Platelet Count - Automated: 350 K/uL  Auto Neutrophil #: 3.14 K/uL  Auto Lymphocyte #: 1.21 K/uL  Auto Monocyte #: 0.44 K/uL  Auto Eosinophil #: 0.03 K/uL  Auto Basophil #: 0.03 K/uL  Auto Neutrophil %: 64.6: Differential percentages must be correlated with absolute numbers for   clinical significance. %  Auto Lymphocyte %: 24.9 %  Auto Monocyte %: 9.1 %  Auto Eosinophil %: 0.6 %  Auto Basophil %: 0.6 %  Auto Immature Granulocyte %: 0.2: (Includes meta, myelo and promyelocytes). Mild elevations in immature   granulocytes may be seen with many inflammatory processes and pregnancy;   clinical correlation suggested. %  Nucleated RBC: 0 /100 WBCs    Comprehensive Metabolic Panel in AM (08.30.24 @ 05:38)   Sodium: 136 mmol/L  Potassium: 4.3 mmol/L  Chloride: 106 mmol/L  Carbon Dioxide: 20 mmol/L  Anion Gap: 10 mmol/L  Blood Urea Nitrogen: 14 mg/dL  Creatinine: 0.65 mg/dL  Glucose: 85 mg/dL  Calcium: 9.2 mg/dL  Protein Total: 7.1 g/dL  Albumin: 2.7 g/dL  Bilirubin Total: 0.4 mg/dL  Alkaline Phosphatase: 98 U/L  Aspartate Aminotransferase (AST/SGOT): 42 U/L  Alanine Aminotransferase (ALT/SGPT): 34 U/L  eGFR: 112:

## 2024-08-30 NOTE — PROGRESS NOTE ADULT - ASSESSMENT
44F Pmhx of depression,  GERD, ETOH use admitted for  ETOH withdrawal.     # Severe Alcohol Dependence Disorder  - Pt was in detox program 2 weeks ago, relapsed 1 wk ago, drinks 1L or more of Vodka a day  -  Last drink on 08/27, 08/28 am pt was having tremors, shakes, anxiety so pt drank portable hand   -No history of DTs or seizures, last admission for ETOH 07/2024  - Pt reports having a prescription by a PCP in [place]  for  xanax and trazodone , and is taking PRN. No  information found on iSTOP  - CIWA symptom trigger Ativan 2 mg PRN  - Kekxmqkjwfs10 mg PRN  - Multivitamin qd   - Thiamine 100 mg qd  - folic acid 1mg qd  - tylenol 650 mg q6   - monitor CMP   - monitor alcohol level 99 -> <3    #Normocytic Anemia  - 10.3/30.1 -> 10.1/30.7  - iron studies f/u ferritin, tibc, haptoglobin     #GERD  - protonix 40 mg qd    #Dispo  - SBIRT reccomendations Pt advised to return to [facility] to work with prior Counselor and consider additional forms of treatment.    #DVT prophylaxis  -lovenox 40 mg subq    discussed with dr. hussein    44F Pmhx of depression,  GERD, ETOH use admitted for  ETOH withdrawal.     # Severe Alcohol Dependence Disorder  - Pt was in detox program 2 weeks ago, relapsed 1 wk ago, drinks 1L or more of Vodka a day  -  Last drink on 08/27, 08/28 am pt was having tremors, shakes, anxiety so pt drank portable hand   -No history of DTs or seizures, last admission for ETOH 07/2024  - Pt reports having a prescription by a PCP in [place]  for  xanax and trazodone , and is taking PRN. No  information found on iSTOP  - CIWA symptom trigger Ativan 2 mg PRN  - Otjidoggapx27 mg PRN  - Multivitamin qd   - Thiamine 100 mg qd  - folic acid 1mg qd  - tylenol 650 mg q6   - monitor CMP   - monitor alcohol level 99 -> <3    #Normocytic Anemia  - 10.3/30.1 -> 10.1/30.7  - iron studies f/u ferritin, tibc, haptoglobin     #smoking  -nicotine patch ordered    #GERD  - protonix 40 mg qd    #Dispo  - SBIRT reccomendations Pt advised to return to [facility] to work with prior Counselor and consider additional forms of treatment.    #DVT prophylaxis  -lovenox 40 mg subq    discussed with dr. hussein
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
## 2024-08-30 NOTE — PROGRESS NOTE ADULT - SUBJECTIVE AND OBJECTIVE BOX
Patient is a 44y old  Female who presents with a chief complaint of alcohol withdrawal (30 Aug 2024 07:52)      INTERVAL HPI/OVERNIGHT EVENTS: Patient seen and examined at bedside. Yesterday pt drank a whole purell wall hang  and she is currently trying to drink from them.  noted discarded empty mini  bottle in the trash can.  Alcohol prep pads also found in the room near the patient. Asked patient if she has drank alcohol in any type of form over the course of her hospital stay, pt stated she did not. Pt states she is eager to get help for her severe alcohol dependence disorder, but she feels very anxious and "needs" ativan. Pt also states she has a prescription from a PCP in [place] to take xanax and trazodone as needed.    MEDICATIONS  (STANDING):  enoxaparin Injectable 40 milliGRAM(s) SubCutaneous every 24 hours  folic acid 1 milliGRAM(s) Oral daily  lactated ringers. 1000 milliLiter(s) (100 mL/Hr) IV Continuous <Continuous>  multivitamin 1 Tablet(s) Oral daily  nicotine -   7 mG/24Hr(s) Patch 1 Patch Transdermal once  pantoprazole    Tablet 40 milliGRAM(s) Oral before breakfast  thiamine 100 milliGRAM(s) Oral daily    MEDICATIONS  (PRN):  acetaminophen     Tablet .. 650 milliGRAM(s) Oral every 6 hours PRN Mild Pain (1 - 3)  LORazepam     Tablet 2 milliGRAM(s) Oral every 2 hours PRN Symptom-triggered 2 point increase in CIWA-Ar  melatonin 6 milliGRAM(s) Oral at bedtime PRN Insomnia      Allergies    Keflex (Anaphylaxis)    Intolerances        REVIEW OF SYSTEMS:  CONSTITUTIONAL: No fever or chills  HEENT:  No headache, no sore throat  RESPIRATORY: No cough, wheezing, or shortness of breath  CARDIOVASCULAR: No chest pain, palpitations  GASTROINTESTINAL: Diarrhea x 1,  No abd pain, nausea, vomiting  GENITOURINARY: No dysuria, frequency, or hematuria  NEUROLOGICAL: + headaches,  no focal weakness or dizziness  MUSCULOSKELETAL: no myalgias     Vital Signs Last 24 Hrs  T(C): 36.9 (30 Aug 2024 05:19), Max: 36.9 (30 Aug 2024 05:19)  T(F): 98.5 (30 Aug 2024 05:19), Max: 98.5 (30 Aug 2024 05:19)  HR: 69 (30 Aug 2024 05:19) (69 - 98)  BP: 124/70 (30 Aug 2024 05:19) (122/83 - 124/70)  BP(mean): --  RR: 18 (30 Aug 2024 05:19) (18 - 18)  SpO2: 99% (30 Aug 2024 05:19) (97% - 99%)    Parameters below as of 30 Aug 2024 05:19  Patient On (Oxygen Delivery Method): room air      I&O's Summary    BMI (kg/m2): 25 (08-28-24 @ 22:40)    PHYSICAL EXAM:  GENERAL: NAD  HEENT:  AT/NC, moist mucous membranes, tongue fasciculations EOMI, conjunctiva and sclera clear  RESPIR:  CTA b/l, no rales, wheezes, or rhonchi,  normal respiratory effort, no intercostal retractions  HEART:  RRR, S1, S2, no murmurs; no pitting edema  ABDOMEN:  BS+, soft, nontender, nondistended; No HSM  MSK/EXTREMITIES: 2+ peripheral pulses, no clubbing or cyanosis  NERVOUS SYSTEM: answers questions and follows commands appropriately, A&Ox3 grossly moves all extremities   SKIN: warm, well perfused, approprate for age, no visible lesions   PSYCH: Appropriate affect, Alert & Awake;       LABS: Personally reviewed  CBC                        10.1   4.86  )-----------( 350      ( 29 Aug 2024 07:00 )             30.7     CMP  08-30    136  |  106  |  14  ----------------------------<  85  4.3   |  20  |  0.65    Ca    9.2      30 Aug 2024 05:38  Phos  3.6     08-29  Mg     1.8     08-29    TPro  7.1  /  Alb  2.7  /  TBili  0.4  /  DBili  x   /  AST  42  /  ALT  34  /  AlkPhos  98  08-30      Lipase: 25 U/L (08-28-24 @ 17:25)                                  Urinalysis Basic - ( 30 Aug 2024 05:38 )    Color: x / Appearance: x / SG: x / pH: x  Gluc: 85 mg/dL / Ketone: x  / Bili: x / Urobili: x   Blood: x / Protein: x / Nitrite: x   Leuk Esterase: x / RBC: x / WBC x   Sq Epi: x / Non Sq Epi: x / Bacteria: x                RADIOLOGY & ADDITIONAL TESTS: Personally reviewed.       Consultant(s) Notes Reviewed:  [x] YES  [ ] NO   Discussed with SUPRIYA/SHERLY, RN

## 2024-08-31 LAB
ALBUMIN SERPL ELPH-MCNC: 3.5 G/DL — SIGNIFICANT CHANGE UP (ref 3.3–5)
ALP SERPL-CCNC: 105 U/L — SIGNIFICANT CHANGE UP (ref 40–120)
ALT FLD-CCNC: 39 U/L — SIGNIFICANT CHANGE UP (ref 10–45)
ANION GAP SERPL CALC-SCNC: 12 MMOL/L — SIGNIFICANT CHANGE UP (ref 5–17)
ANION GAP SERPL CALC-SCNC: 12 MMOL/L — SIGNIFICANT CHANGE UP (ref 5–17)
AST SERPL-CCNC: 26 U/L — SIGNIFICANT CHANGE UP (ref 10–40)
BILIRUB SERPL-MCNC: 0.2 MG/DL — SIGNIFICANT CHANGE UP (ref 0.2–1.2)
BUN SERPL-MCNC: 11 MG/DL — SIGNIFICANT CHANGE UP (ref 7–23)
BUN SERPL-MCNC: 12 MG/DL — SIGNIFICANT CHANGE UP (ref 7–23)
CALCIUM SERPL-MCNC: 9.5 MG/DL — SIGNIFICANT CHANGE UP (ref 8.4–10.5)
CALCIUM SERPL-MCNC: 9.7 MG/DL — SIGNIFICANT CHANGE UP (ref 8.4–10.5)
CHLORIDE SERPL-SCNC: 106 MMOL/L — SIGNIFICANT CHANGE UP (ref 96–108)
CHLORIDE SERPL-SCNC: 107 MMOL/L — SIGNIFICANT CHANGE UP (ref 96–108)
CO2 SERPL-SCNC: 19 MMOL/L — LOW (ref 22–31)
CO2 SERPL-SCNC: 21 MMOL/L — LOW (ref 22–31)
CREAT SERPL-MCNC: 1.6 MG/DL — HIGH (ref 0.5–1.3)
CREAT SERPL-MCNC: 1.71 MG/DL — HIGH (ref 0.5–1.3)
EGFR: 38 ML/MIN/1.73M2 — LOW
EGFR: 41 ML/MIN/1.73M2 — LOW
GLUCOSE SERPL-MCNC: 85 MG/DL — SIGNIFICANT CHANGE UP (ref 70–99)
GLUCOSE SERPL-MCNC: 92 MG/DL — SIGNIFICANT CHANGE UP (ref 70–99)
HCT VFR BLD CALC: 33.3 % — LOW (ref 34.5–45)
HGB BLD-MCNC: 11.2 G/DL — LOW (ref 11.5–15.5)
MCHC RBC-ENTMCNC: 29.2 PG — SIGNIFICANT CHANGE UP (ref 27–34)
MCHC RBC-ENTMCNC: 33.6 GM/DL — SIGNIFICANT CHANGE UP (ref 32–36)
MCV RBC AUTO: 86.7 FL — SIGNIFICANT CHANGE UP (ref 80–100)
NRBC # BLD: 0 /100 WBCS — SIGNIFICANT CHANGE UP (ref 0–0)
PLATELET # BLD AUTO: 411 K/UL — HIGH (ref 150–400)
POTASSIUM SERPL-MCNC: 3.7 MMOL/L — SIGNIFICANT CHANGE UP (ref 3.5–5.3)
POTASSIUM SERPL-MCNC: 4.6 MMOL/L — SIGNIFICANT CHANGE UP (ref 3.5–5.3)
POTASSIUM SERPL-SCNC: 3.7 MMOL/L — SIGNIFICANT CHANGE UP (ref 3.5–5.3)
POTASSIUM SERPL-SCNC: 4.6 MMOL/L — SIGNIFICANT CHANGE UP (ref 3.5–5.3)
PROT SERPL-MCNC: 7.9 G/DL — SIGNIFICANT CHANGE UP (ref 6–8.3)
RBC # BLD: 3.84 M/UL — SIGNIFICANT CHANGE UP (ref 3.8–5.2)
RBC # FLD: 13.6 % — SIGNIFICANT CHANGE UP (ref 10.3–14.5)
SODIUM SERPL-SCNC: 138 MMOL/L — SIGNIFICANT CHANGE UP (ref 135–145)
SODIUM SERPL-SCNC: 139 MMOL/L — SIGNIFICANT CHANGE UP (ref 135–145)
WBC # BLD: 7.47 K/UL — SIGNIFICANT CHANGE UP (ref 3.8–10.5)
WBC # FLD AUTO: 7.47 K/UL — SIGNIFICANT CHANGE UP (ref 3.8–10.5)

## 2024-08-31 PROCEDURE — 99233 SBSQ HOSP IP/OBS HIGH 50: CPT | Mod: GC

## 2024-08-31 RX ORDER — SODIUM CHLORIDE 9 MG/ML
500 INJECTION INTRAMUSCULAR; INTRAVENOUS; SUBCUTANEOUS
Refills: 0 | Status: DISCONTINUED | OUTPATIENT
Start: 2024-08-31 | End: 2024-09-01

## 2024-08-31 RX ORDER — HEPARIN SODIUM,BOVINE 1000/ML
5000 VIAL (ML) INJECTION EVERY 12 HOURS
Refills: 0 | Status: DISCONTINUED | OUTPATIENT
Start: 2024-09-01 | End: 2024-09-03

## 2024-08-31 RX ORDER — SODIUM CHLORIDE 9 MG/ML
1000 INJECTION INTRAMUSCULAR; INTRAVENOUS; SUBCUTANEOUS
Refills: 0 | Status: DISCONTINUED | OUTPATIENT
Start: 2024-08-31 | End: 2024-09-01

## 2024-08-31 RX ADMIN — Medication 1 PATCH: at 11:59

## 2024-08-31 RX ADMIN — ENOXAPARIN SODIUM 40 MILLIGRAM(S): 100 INJECTION SUBCUTANEOUS at 06:43

## 2024-08-31 RX ADMIN — Medication 1 PATCH: at 11:30

## 2024-08-31 RX ADMIN — Medication 6 MILLIGRAM(S): at 21:16

## 2024-08-31 RX ADMIN — LORAZEPAM 2 MILLIGRAM(S): 4 INJECTION INTRAMUSCULAR; INTRAVENOUS at 06:52

## 2024-08-31 RX ADMIN — ACAMPROSATE CALCIUM 666 MILLIGRAM(S): 333 TABLET, DELAYED RELEASE ORAL at 06:43

## 2024-08-31 RX ADMIN — SODIUM CHLORIDE 100 MILLILITER(S): 9 INJECTION INTRAMUSCULAR; INTRAVENOUS; SUBCUTANEOUS at 12:04

## 2024-08-31 RX ADMIN — Medication 1 PATCH: at 09:19

## 2024-08-31 RX ADMIN — LORAZEPAM 2 MILLIGRAM(S): 4 INJECTION INTRAMUSCULAR; INTRAVENOUS at 21:22

## 2024-08-31 RX ADMIN — Medication 25 MILLIGRAM(S): at 14:59

## 2024-08-31 RX ADMIN — Medication 40 MILLIGRAM(S): at 06:43

## 2024-08-31 RX ADMIN — ACETAMINOPHEN 650 MILLIGRAM(S): 325 TABLET ORAL at 14:59

## 2024-08-31 RX ADMIN — Medication 1 MILLIGRAM(S): at 15:00

## 2024-08-31 RX ADMIN — Medication 100 MILLIGRAM(S): at 19:41

## 2024-08-31 RX ADMIN — Medication 1 TABLET(S): at 15:01

## 2024-08-31 NOTE — PROGRESS NOTE ADULT - SUBJECTIVE AND OBJECTIVE BOX
Patient is a 44y old  Female who presents with a chief complaint of alcohol withdrawal (31 Aug 2024 08:00)      INTERVAL HPI/OVERNIGHT EVENTS: Patient seen and examined at bedside. Pt states anxiety is better and  No overnight events.    MEDICATIONS  (STANDING):  acamprosate 666 milliGRAM(s) Oral three times a day  folic acid 1 milliGRAM(s) Oral daily  multivitamin 1 Tablet(s) Oral daily  nicotine -  14 mG/24Hr(s) Patch 1 Patch Transdermal daily  pantoprazole    Tablet 40 milliGRAM(s) Oral before breakfast  sodium chloride 0.9%. 500 milliLiter(s) (100 mL/Hr) IV Continuous <Continuous>  thiamine 100 milliGRAM(s) Oral daily    MEDICATIONS  (PRN):  acetaminophen     Tablet .. 650 milliGRAM(s) Oral every 6 hours PRN Mild Pain (1 - 3)  hydrOXYzine hydrochloride 25 milliGRAM(s) Oral daily PRN Anxiety  LORazepam     Tablet 2 milliGRAM(s) Oral every 2 hours PRN Symptom-triggered 2 point increase in CIWA-Ar  melatonin 6 milliGRAM(s) Oral at bedtime PRN Insomnia      Allergies    Keflex (Anaphylaxis)    Intolerances        REVIEW OF SYSTEMS:  CONSTITUTIONAL: No fever or chills  HEENT:  No headache, no sore throat  RESPIRATORY: No cough, wheezing, or shortness of breath  CARDIOVASCULAR: No chest pain, palpitations  GASTROINTESTINAL: No abd pain, nausea, vomiting, or diarrhea  GENITOURINARY: No dysuria, frequency, or hematuria  NEUROLOGICAL: no focal weakness or dizziness  MUSCULOSKELETAL: no myalgias     Vital Signs Last 24 Hrs  T(C): 36.6 (31 Aug 2024 12:32), Max: 36.7 (31 Aug 2024 05:19)  T(F): 97.9 (31 Aug 2024 12:32), Max: 98 (31 Aug 2024 05:19)  HR: 94 (31 Aug 2024 12:32) (93 - 95)  BP: 97/63 (31 Aug 2024 12:32) (97/63 - 134/75)  BP(mean): --  RR: 18 (31 Aug 2024 12:32) (18 - 18)  SpO2: 96% (31 Aug 2024 12:32) (96% - 97%)    Parameters below as of 31 Aug 2024 12:32  Patient On (Oxygen Delivery Method): room air      I&O's Summary    31 Aug 2024 07:01  -  31 Aug 2024 14:39  --------------------------------------------------------  IN: 480 mL / OUT: 0 mL / NET: 480 mL      BMI (kg/m2): 25 (08-28-24 @ 22:40)    PHYSICAL EXAM:  GENERAL: NAD  HEENT:  AT/NC, moist mucous membranes, EOMI, conjunctiva and sclera clear  RESPIR:  CTA b/l, no rales, wheezes, or rhonchi,  normal respiratory effort, no intercostal retractions  HEART:  RRR, S1, S2, no murmurs; no pitting edema  ABDOMEN:  BS+, soft, nontender, nondistended; No HSM  MSK/EXTREMITIES: 2+ peripheral pulses, no clubbing or cyanosis  NERVOUS SYSTEM: answers questions and follows commands appropriately, A&Ox3 grossly moves all extremities   SKIN: warm, well perfused, approprate for age, no visible lesions   PSYCH: Appropriate affect, Alert & Awake; Good judgement      LABS: Personally reviewed  CBC                        11.2   7.47  )-----------( 411      ( 31 Aug 2024 06:31 )             33.3     CMP  08-31    139  |  106  |  11  ----------------------------<  92  3.7   |  21  |  1.71    Ca    9.7      31 Aug 2024 06:31  Phos  3.6     08-29  Mg     1.8     08-29    TPro  7.9  /  Alb  3.5  /  TBili  0.2  /  DBili  x   /  AST  26  /  ALT  39  /  AlkPhos  105  08-31      Lipase: 25 U/L (08-28-24 @ 17:25)                                  Urinalysis Basic - ( 31 Aug 2024 06:31 )    Color: x / Appearance: x / SG: x / pH: x  Gluc: 92 mg/dL / Ketone: x  / Bili: x / Urobili: x   Blood: x / Protein: x / Nitrite: x   Leuk Esterase: x / RBC: x / WBC x   Sq Epi: x / Non Sq Epi: x / Bacteria: x                RADIOLOGY & ADDITIONAL TESTS: Personally reviewed.       Consultant(s) Notes Reviewed:  [x] YES  [ ] NO   Discussed with SUPRIYA/SHERLY, RN     Patient is a 44y old  Female who presents with a chief complaint of alcohol withdrawal (31 Aug 2024 08:00)      INTERVAL HPI/OVERNIGHT EVENTS: Patient seen and examined at bedside. Pt states anxiety is better and  No overnight events.    MEDICATIONS  (STANDING):  acamprosate 666 milliGRAM(s) Oral three times a day  folic acid 1 milliGRAM(s) Oral daily  multivitamin 1 Tablet(s) Oral daily  nicotine -  14 mG/24Hr(s) Patch 1 Patch Transdermal daily  pantoprazole    Tablet 40 milliGRAM(s) Oral before breakfast  sodium chloride 0.9%. 500 milliLiter(s) (100 mL/Hr) IV Continuous <Continuous>  thiamine 100 milliGRAM(s) Oral daily    MEDICATIONS  (PRN):  acetaminophen     Tablet .. 650 milliGRAM(s) Oral every 6 hours PRN Mild Pain (1 - 3)  hydrOXYzine hydrochloride 25 milliGRAM(s) Oral daily PRN Anxiety  LORazepam     Tablet 2 milliGRAM(s) Oral every 2 hours PRN Symptom-triggered 2 point increase in CIWA-Ar  melatonin 6 milliGRAM(s) Oral at bedtime PRN Insomnia      Allergies    Keflex (Anaphylaxis)    Intolerances        REVIEW OF SYSTEMS:  CONSTITUTIONAL: No fever or chills  HEENT:  No headache, no sore throat  RESPIRATORY: No cough, wheezing, or shortness of breath  CARDIOVASCULAR: No chest pain, palpitations  GASTROINTESTINAL: Diarrhea x 2,  No abd pain, nausea, vomiting  GENITOURINARY: No dysuria, frequency, or hematuria  NEUROLOGICAL:  no focal weakness or dizziness  MUSCULOSKELETAL: no myalgias    Vital Signs Last 24 Hrs  T(C): 36.6 (31 Aug 2024 12:32), Max: 36.7 (31 Aug 2024 05:19)  T(F): 97.9 (31 Aug 2024 12:32), Max: 98 (31 Aug 2024 05:19)  HR: 94 (31 Aug 2024 12:32) (93 - 95)  BP: 97/63 (31 Aug 2024 12:32) (97/63 - 134/75)  BP(mean): --  RR: 18 (31 Aug 2024 12:32) (18 - 18)  SpO2: 96% (31 Aug 2024 12:32) (96% - 97%)    Parameters below as of 31 Aug 2024 12:32  Patient On (Oxygen Delivery Method): room air      I&O's Summary    31 Aug 2024 07:01  -  31 Aug 2024 14:39  --------------------------------------------------------  IN: 480 mL / OUT: 0 mL / NET: 480 mL      BMI (kg/m2): 25 (08-28-24 @ 22:40)    PHYSICAL EXAM:  GENERAL: NAD  HEENT:  AT/NC, moist mucous membranes, EOMI, conjunctiva and sclera clear  RESPIR:  CTA b/l, no rales, wheezes, or rhonchi,  normal respiratory effort, no intercostal retractions  HEART:  RRR, S1, S2, no murmurs; no pitting edema  ABDOMEN:  BS+, soft, nontender, nondistended; No HSM  MSK/EXTREMITIES: 2+ peripheral pulses, no clubbing or cyanosis  NERVOUS SYSTEM: answers questions and follows commands appropriately, A&Ox3 grossly moves all extremities   SKIN: warm, well perfused, approprate for age, no visible lesions   PSYCH: Appropriate affect, Alert & Awake;       LABS: Personally reviewed  CBC                        11.2   7.47  )-----------( 411      ( 31 Aug 2024 06:31 )             33.3     CMP  08-31    139  |  106  |  11  ----------------------------<  92  3.7   |  21  |  1.71    Ca    9.7      31 Aug 2024 06:31  Phos  3.6     08-29  Mg     1.8     08-29    TPro  7.9  /  Alb  3.5  /  TBili  0.2  /  DBili  x   /  AST  26  /  ALT  39  /  AlkPhos  105  08-31      Lipase: 25 U/L (08-28-24 @ 17:25)                                  Urinalysis Basic - ( 31 Aug 2024 06:31 )    Color: x / Appearance: x / SG: x / pH: x  Gluc: 92 mg/dL / Ketone: x  / Bili: x / Urobili: x   Blood: x / Protein: x / Nitrite: x   Leuk Esterase: x / RBC: x / WBC x   Sq Epi: x / Non Sq Epi: x / Bacteria: x                RADIOLOGY & ADDITIONAL TESTS: Personally reviewed.       Consultant(s) Notes Reviewed:  [x] YES  [ ] NO   Discussed with SW/SHERLY, RN
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
## 2024-09-01 LAB
ALBUMIN SERPL ELPH-MCNC: 3.3 G/DL — SIGNIFICANT CHANGE UP (ref 3.3–5)
ALP SERPL-CCNC: 93 U/L — SIGNIFICANT CHANGE UP (ref 40–120)
ALT FLD-CCNC: 31 U/L — SIGNIFICANT CHANGE UP (ref 10–45)
ANION GAP SERPL CALC-SCNC: 10 MMOL/L — SIGNIFICANT CHANGE UP (ref 5–17)
AST SERPL-CCNC: 16 U/L — SIGNIFICANT CHANGE UP (ref 10–40)
BILIRUB SERPL-MCNC: 0.4 MG/DL — SIGNIFICANT CHANGE UP (ref 0.2–1.2)
BUN SERPL-MCNC: 17 MG/DL — SIGNIFICANT CHANGE UP (ref 7–23)
CALCIUM SERPL-MCNC: 9.3 MG/DL — SIGNIFICANT CHANGE UP (ref 8.4–10.5)
CHLORIDE SERPL-SCNC: 105 MMOL/L — SIGNIFICANT CHANGE UP (ref 96–108)
CO2 SERPL-SCNC: 23 MMOL/L — SIGNIFICANT CHANGE UP (ref 22–31)
CREAT SERPL-MCNC: 1.17 MG/DL — SIGNIFICANT CHANGE UP (ref 0.5–1.3)
EGFR: 59 ML/MIN/1.73M2 — LOW
GLUCOSE SERPL-MCNC: 90 MG/DL — SIGNIFICANT CHANGE UP (ref 70–99)
HCT VFR BLD CALC: 33.3 % — LOW (ref 34.5–45)
HGB BLD-MCNC: 10.9 G/DL — LOW (ref 11.5–15.5)
MCHC RBC-ENTMCNC: 29.3 PG — SIGNIFICANT CHANGE UP (ref 27–34)
MCHC RBC-ENTMCNC: 32.7 GM/DL — SIGNIFICANT CHANGE UP (ref 32–36)
MCV RBC AUTO: 89.5 FL — SIGNIFICANT CHANGE UP (ref 80–100)
NRBC # BLD: 0 /100 WBCS — SIGNIFICANT CHANGE UP (ref 0–0)
PLATELET # BLD AUTO: 356 K/UL — SIGNIFICANT CHANGE UP (ref 150–400)
POTASSIUM SERPL-MCNC: 4.1 MMOL/L — SIGNIFICANT CHANGE UP (ref 3.5–5.3)
POTASSIUM SERPL-SCNC: 4.1 MMOL/L — SIGNIFICANT CHANGE UP (ref 3.5–5.3)
PROT SERPL-MCNC: 7.5 G/DL — SIGNIFICANT CHANGE UP (ref 6–8.3)
RBC # BLD: 3.72 M/UL — LOW (ref 3.8–5.2)
RBC # FLD: 13.9 % — SIGNIFICANT CHANGE UP (ref 10.3–14.5)
SODIUM SERPL-SCNC: 138 MMOL/L — SIGNIFICANT CHANGE UP (ref 135–145)
WBC # BLD: 4.78 K/UL — SIGNIFICANT CHANGE UP (ref 3.8–10.5)
WBC # FLD AUTO: 4.78 K/UL — SIGNIFICANT CHANGE UP (ref 3.8–10.5)

## 2024-09-01 PROCEDURE — 99233 SBSQ HOSP IP/OBS HIGH 50: CPT | Mod: GC

## 2024-09-01 RX ADMIN — Medication 40 MILLIGRAM(S): at 05:45

## 2024-09-01 RX ADMIN — Medication 100 MILLIGRAM(S): at 14:34

## 2024-09-01 RX ADMIN — LORAZEPAM 2 MILLIGRAM(S): 4 INJECTION INTRAMUSCULAR; INTRAVENOUS at 05:54

## 2024-09-01 RX ADMIN — Medication 5000 UNIT(S): at 19:55

## 2024-09-01 RX ADMIN — ACETAMINOPHEN 650 MILLIGRAM(S): 325 TABLET ORAL at 20:19

## 2024-09-01 RX ADMIN — ACETAMINOPHEN 650 MILLIGRAM(S): 325 TABLET ORAL at 05:46

## 2024-09-01 RX ADMIN — Medication 1 PATCH: at 08:48

## 2024-09-01 RX ADMIN — Medication 1 MILLIGRAM(S): at 14:34

## 2024-09-01 RX ADMIN — Medication 1 PATCH: at 14:37

## 2024-09-01 RX ADMIN — Medication 1 TABLET(S): at 14:34

## 2024-09-01 RX ADMIN — Medication 6 MILLIGRAM(S): at 22:35

## 2024-09-01 RX ADMIN — Medication 5000 UNIT(S): at 05:45

## 2024-09-01 RX ADMIN — SODIUM CHLORIDE 75 MILLILITER(S): 9 INJECTION INTRAMUSCULAR; INTRAVENOUS; SUBCUTANEOUS at 05:50
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
## 2024-09-01 NOTE — DISCHARGE NOTE PROVIDER - NSDCCPCAREPLAN_GEN_ALL_CORE_FT
PRINCIPAL DISCHARGE DIAGNOSIS  Diagnosis: Alcohol intoxication, uncomplicated  Assessment and Plan of Treatment: You were admitted to [facility] for Alcohol withdrawl. Please take steps to cut down on your alcohol intake. Make some changes in your daily habits and get a new routine. Avoid spending time with people you used to drink with. Spend time with friends who do not drink. If you do drink,  limit how much you drink or alternate your drinks with a glass of water or other drink that does not contain alcohol. Do not drink on an empty stomach. Food may help slow down absorption. Do not drive if you have been drinking.   Signs of alcohol poisoning include: constant vomitting, slow breathing, less than 8 times a minute, irregular breathing, skin that looks blue or pale or feels cool to the touch, inabiliy to wake up, and Seizures. Signs of delirium tremens, or DTs, include confusion, anxiety, shaking, sweating, fever of  100.4°F (38°C) or higher, or seeing, hearing, feeling, smelling, or tasting things that aren’t there (hallucinations). Please return to the ED if you expereince any of these symptoms.         SECONDARY DISCHARGE DIAGNOSES  Diagnosis: Generalized anxiety disorder  Assessment and Plan of Treatment: Get at least 30 minutes of exercise on most days of the week. Walking is a good choice. You also may want to do other activities, such as running, swimming, cycling, or playing tennis or team sports.  Learn and do relaxation exercises, such as deep breathing.  Go to bed at the same time every night. Try for 8 to 10 hours of sleep a night. Avoid alcohol, marijuana, and illegal drugs.  Find a counselor who uses cognitive behavioral therapy (CBT).  Don't isolate yourself. Let those closest to you help you. Find someone you can trust and confide in. Talk to that person.  Be safe with medicines. Take your medicines exactly as prescribed. Call your doctor if you think you are having a problem with your medicine.  Practice healthy thinking. How you think can affect how you feel and act. Ask yourself if your thoughts are helpful or unhelpful. If they are unhelpful, you can learn how to change them.  Recognize and accept your anxiety. When you feel anxious, say to yourself, "This is not an emergency. I feel uncomfortable, but I am not in danger. I can keep going even if I feel anxious." Call 911 anytime you think you may need emergency care. For example, call if:  You feel you can't stop from hurting yourself or someone else.     PRINCIPAL DISCHARGE DIAGNOSIS  Diagnosis: Alcohol intoxication, uncomplicated  Assessment and Plan of Treatment: You were admitted to [facility] for Alcohol withdrawl. Please take steps to cut down on your alcohol intake. Please continue taking pantoprazole for GI protection.   Make some changes in your daily habits and get a new routine. Avoid spending time with people you used to drink with. Spend time with friends who do not drink. If you do drink,  limit how much you drink or alternate your drinks with a glass of water or other drink that does not contain alcohol. Do not drink on an empty stomach. Food may help slow down absorption. Do not drive if you have been drinking.   Signs of alcohol poisoning include: constant vomitting, slow breathing, less than 8 times a minute, irregular breathing, skin that looks blue or pale or feels cool to the touch, inabiliy to wake up, and Seizures. Signs of delirium tremens, or DTs, include confusion, anxiety, shaking, sweating, fever of  100.4°F (38°C) or higher, or seeing, hearing, feeling, smelling, or tasting things that aren’t there (hallucinations). Please return to the ED if you expereince any of these symptoms.         SECONDARY DISCHARGE DIAGNOSES  Diagnosis: Generalized anxiety disorder  Assessment and Plan of Treatment: You had a lot of anxiety during your stay. We started you on an anti-anxiety medication called Atarax. Please take Atarax 25 mg up to 4 times a day, as needed until you can get counseling.    Get at least 30 minutes of exercise on most days of the week. Walking is a good choice. You also may want to do other activities, such as running, swimming, cycling, or playing tennis or team sports.  Learn and do relaxation exercises, such as deep breathing.  Go to bed at the same time every night. Try for 8 to 10 hours of sleep a night. Avoid alcohol, marijuana, and illegal drugs.  Find a counselor who uses cognitive behavioral therapy (CBT).  Don't isolate yourself. Let those closest to you help you. Find someone you can trust and confide in. Talk to that person.  Be safe with medicines. Take your medicines exactly as prescribed. Call your doctor if you think you are having a problem with your medicine.  Practice healthy thinking. How you think can affect how you feel and act. Ask yourself if your thoughts are helpful or unhelpful. If they are unhelpful, you can learn how to change them.  Recognize and accept your anxiety. When you feel anxious, say to yourself, "This is not an emergency. I feel uncomfortable, but I am not in danger. I can keep going even if I feel anxious." Call 911 anytime you think you may need emergency care. For example, call if:  You feel you can't stop from hurting yourself or someone else.

## 2024-09-01 NOTE — DISCHARGE NOTE PROVIDER - NSDCCAREPROVSEEN_GEN_ALL_CORE_FT
Tawanna, Charlotte Stauffer, Patricia Rodgers, Mohit Nieto, Brody Robbins, Adan Ventura, Vilma Samayoa, Daniel Smith, Abbie Constantino

## 2024-09-01 NOTE — DISCHARGE NOTE PROVIDER - NSDCMRMEDTOKEN_GEN_ALL_CORE_FT
omeprazole 40 mg oral delayed release capsule: 1 cap(s) orally once a day   folic acid 1 mg oral tablet: 1 tab(s) orally once a day  hydrOXYzine hydrochloride 25 mg oral tablet: 1 tab(s) orally 4 times a day as needed for Anxiety  Multiple Vitamins oral tablet: 1 tab(s) orally once a day  omeprazole 40 mg oral delayed release capsule: 1 cap(s) orally once a day  thiamine 100 mg oral tablet: 1 tab(s) orally once a day

## 2024-09-01 NOTE — DISCHARGE NOTE PROVIDER - NSFOLLOWUPCLINICS_GEN_ALL_ED_FT
[facility]  Family Medicine  [address] [place] [id number]  Phone: [phone]  Fax:   Follow Up Time: 1 week

## 2024-09-01 NOTE — DISCHARGE NOTE PROVIDER - ATTENDING DISCHARGE PHYSICAL EXAMINATION:
Constitutional: Pt lying in bed, awake and alert, anxious  HEENT: EOMI, normal hearing, moist mucous membranes  Neck: Soft and supple, no JVD  Respiratory: CTABL, No wheezing, rales or rhonchi  Cardiovascular: S1S2+, RRR, no M/G/R  Gastrointestinal: BS+, soft, NT/ND, no guarding, no rebound  Extremities: No peripheral edema  Vascular: 2+ peripheral pulses, no clubbing or cyanosis   Neurological: AAOx3, no focal deficits  Musculoskeletal: 5/5 strength b/l upper and lower extremities  Skin: No rashe, skin is warm and well perfused   PSYCH: labile today, Alert & Awake; cooperative

## 2024-09-01 NOTE — PROGRESS NOTE ADULT - SUBJECTIVE AND OBJECTIVE BOX
Patient is a 44y old  Female who presents with a chief complaint of alcohol withdrawal (31 Aug 2024 08:00)      INTERVAL HPI/OVERNIGHT EVENTS: Patient seen and examined at bedside. No overnight events. Slept well     MEDICATIONS  (STANDING):  folic acid 1 milliGRAM(s) Oral daily  heparin   Injectable 5000 Unit(s) SubCutaneous every 12 hours  multivitamin 1 Tablet(s) Oral daily  nicotine -  14 mG/24Hr(s) Patch 1 Patch Transdermal daily  pantoprazole    Tablet 40 milliGRAM(s) Oral before breakfast  sodium chloride 0.9%. 1000 milliLiter(s) (75 mL/Hr) IV Continuous <Continuous>  sodium chloride 0.9%. 500 milliLiter(s) (100 mL/Hr) IV Continuous <Continuous>  thiamine 100 milliGRAM(s) Oral daily    MEDICATIONS  (PRN):  acetaminophen     Tablet .. 650 milliGRAM(s) Oral every 6 hours PRN Mild Pain (1 - 3)  hydrOXYzine hydrochloride 25 milliGRAM(s) Oral daily PRN Anxiety  LORazepam     Tablet 2 milliGRAM(s) Oral every 2 hours PRN Symptom-triggered 2 point increase in CIWA-Ar  melatonin 6 milliGRAM(s) Oral at bedtime PRN Insomnia      Allergies    Keflex (Anaphylaxis)    Intolerances        REVIEW OF SYSTEMS:  CONSTITUTIONAL: No fever or chills  HEENT:  No headache, no sore throat  RESPIRATORY: No cough, wheezing, or shortness of breath  CARDIOVASCULAR: No chest pain, palpitations  GASTROINTESTINAL: No abd pain, nausea, vomiting, or diarrhea  GENITOURINARY: No dysuria, frequency, or hematuria  NEUROLOGICAL: no focal weakness or dizziness  MUSCULOSKELETAL: no myalgias     Vital Signs Last 24 Hrs  T(C): 36.8 (01 Sep 2024 12:42), Max: 36.8 (01 Sep 2024 12:42)  T(F): 98.2 (01 Sep 2024 12:42), Max: 98.2 (01 Sep 2024 12:42)  HR: 90 (01 Sep 2024 12:42) (87 - 90)  BP: 100/65 (01 Sep 2024 12:42) (100/65 - 115/77)  BP(mean): --  RR: 18 (01 Sep 2024 12:42) (18 - 18)  SpO2: 98% (01 Sep 2024 12:42) (98% - 100%)    Parameters below as of 01 Sep 2024 12:42  Patient On (Oxygen Delivery Method): room air      I&O's Summary    31 Aug 2024 07:01  -  01 Sep 2024 07:00  --------------------------------------------------------  IN: 480 mL / OUT: 0 mL / NET: 480 mL    01 Sep 2024 07:01  -  01 Sep 2024 14:29  --------------------------------------------------------  IN: 240 mL / OUT: 0 mL / NET: 240 mL      BMI (kg/m2): 25 (08-28-24 @ 22:40)    PHYSICAL EXAM:  GENERAL: NAD  HEENT:  AT/NC, moist mucous membranes, EOMI, conjunctiva and sclera clear  RESPIR:  CTA b/l, no rales, wheezes, or rhonchi,  normal respiratory effort, no intercostal retractions  HEART:  RRR, S1, S2, no murmurs; no pitting edema  ABDOMEN:  BS+, soft, nontender, nondistended; No HSM  MSK/EXTREMITIES: 2+ peripheral pulses, no clubbing or cyanosis, minimal hand tremor   NERVOUS SYSTEM: answers questions and follows commands appropriately, A&Ox3 grossly moves all extremities   SKIN: warm, well perfused, approprate for age, no visible lesions   PSYCH: Appropriate affect, Alert & Awake; Good judgement at the moment      LABS: Personally reviewed  CBC                        10.9   4.78  )-----------( 356      ( 01 Sep 2024 05:56 )             33.3     CMP  09-01    138  |  105  |  17  ----------------------------<  90  4.1   |  23  |  1.17    Ca    9.3      01 Sep 2024 05:56    TPro  7.5  /  Alb  3.3  /  TBili  0.4  /  DBili  x   /  AST  16  /  ALT  31  /  AlkPhos  93  09-01      Lipase: 25 U/L (08-28-24 @ 17:25)                                  Urinalysis Basic - ( 01 Sep 2024 05:56 )    Color: x / Appearance: x / SG: x / pH: x  Gluc: 90 mg/dL / Ketone: x  / Bili: x / Urobili: x   Blood: x / Protein: x / Nitrite: x   Leuk Esterase: x / RBC: x / WBC x   Sq Epi: x / Non Sq Epi: x / Bacteria: x                RADIOLOGY & ADDITIONAL TESTS: Personally reviewed.       Consultant(s) Notes Reviewed:  [x] YES  [ ] NO   Discussed with SUPRIYA/SHERLY, RN

## 2024-09-02 LAB
ALBUMIN SERPL ELPH-MCNC: 3.3 G/DL — SIGNIFICANT CHANGE UP (ref 3.3–5)
ALP SERPL-CCNC: 87 U/L — SIGNIFICANT CHANGE UP (ref 40–120)
ALT FLD-CCNC: 26 U/L — SIGNIFICANT CHANGE UP (ref 10–45)
ANION GAP SERPL CALC-SCNC: 10 MMOL/L — SIGNIFICANT CHANGE UP (ref 5–17)
AST SERPL-CCNC: 13 U/L — SIGNIFICANT CHANGE UP (ref 10–40)
BILIRUB SERPL-MCNC: 0.4 MG/DL — SIGNIFICANT CHANGE UP (ref 0.2–1.2)
BUN SERPL-MCNC: 17 MG/DL — SIGNIFICANT CHANGE UP (ref 7–23)
CALCIUM SERPL-MCNC: 9.6 MG/DL — SIGNIFICANT CHANGE UP (ref 8.4–10.5)
CHLORIDE SERPL-SCNC: 104 MMOL/L — SIGNIFICANT CHANGE UP (ref 96–108)
CO2 SERPL-SCNC: 23 MMOL/L — SIGNIFICANT CHANGE UP (ref 22–31)
CREAT SERPL-MCNC: 0.68 MG/DL — SIGNIFICANT CHANGE UP (ref 0.5–1.3)
EGFR: 111 ML/MIN/1.73M2 — SIGNIFICANT CHANGE UP
GLUCOSE SERPL-MCNC: 95 MG/DL — SIGNIFICANT CHANGE UP (ref 70–99)
HCT VFR BLD CALC: 34 % — LOW (ref 34.5–45)
HGB BLD-MCNC: 11.2 G/DL — LOW (ref 11.5–15.5)
MCHC RBC-ENTMCNC: 29.1 PG — SIGNIFICANT CHANGE UP (ref 27–34)
MCHC RBC-ENTMCNC: 32.9 GM/DL — SIGNIFICANT CHANGE UP (ref 32–36)
MCV RBC AUTO: 88.3 FL — SIGNIFICANT CHANGE UP (ref 80–100)
NRBC # BLD: 0 /100 WBCS — SIGNIFICANT CHANGE UP (ref 0–0)
PLATELET # BLD AUTO: 226 K/UL — SIGNIFICANT CHANGE UP (ref 150–400)
POTASSIUM SERPL-MCNC: 4 MMOL/L — SIGNIFICANT CHANGE UP (ref 3.5–5.3)
POTASSIUM SERPL-SCNC: 4 MMOL/L — SIGNIFICANT CHANGE UP (ref 3.5–5.3)
PROT SERPL-MCNC: 7.4 G/DL — SIGNIFICANT CHANGE UP (ref 6–8.3)
RBC # BLD: 3.85 M/UL — SIGNIFICANT CHANGE UP (ref 3.8–5.2)
RBC # FLD: 14 % — SIGNIFICANT CHANGE UP (ref 10.3–14.5)
SODIUM SERPL-SCNC: 137 MMOL/L — SIGNIFICANT CHANGE UP (ref 135–145)
WBC # BLD: 4.81 K/UL — SIGNIFICANT CHANGE UP (ref 3.8–10.5)
WBC # FLD AUTO: 4.81 K/UL — SIGNIFICANT CHANGE UP (ref 3.8–10.5)

## 2024-09-02 PROCEDURE — 99233 SBSQ HOSP IP/OBS HIGH 50: CPT | Mod: GC

## 2024-09-02 RX ORDER — HYDROXYZINE HCL 25 MG
1 TABLET ORAL
Qty: 120 | Refills: 0
Start: 2024-09-02 | End: 2024-10-01

## 2024-09-02 RX ORDER — HYDROXYZINE HCL 25 MG
25 TABLET ORAL ONCE
Refills: 0 | Status: COMPLETED | OUTPATIENT
Start: 2024-09-02 | End: 2024-09-02

## 2024-09-02 RX ORDER — HYDROXYZINE HCL 25 MG
25 TABLET ORAL
Refills: 0 | Status: DISCONTINUED | OUTPATIENT
Start: 2024-09-03 | End: 2024-09-03

## 2024-09-02 RX ORDER — THIAMINE HCL 250 MG
1 TABLET ORAL
Qty: 30 | Refills: 0
Start: 2024-09-02 | End: 2024-10-01

## 2024-09-02 RX ORDER — FOLIC ACID 1 MG
1 TABLET ORAL
Qty: 30 | Refills: 0
Start: 2024-09-02 | End: 2024-10-01

## 2024-09-02 RX ADMIN — Medication 1 MILLIGRAM(S): at 14:05

## 2024-09-02 RX ADMIN — Medication 1 PATCH: at 19:14

## 2024-09-02 RX ADMIN — Medication 1 TABLET(S): at 14:05

## 2024-09-02 RX ADMIN — ACETAMINOPHEN 650 MILLIGRAM(S): 325 TABLET ORAL at 20:09

## 2024-09-02 RX ADMIN — Medication 40 MILLIGRAM(S): at 06:39

## 2024-09-02 RX ADMIN — ACETAMINOPHEN 650 MILLIGRAM(S): 325 TABLET ORAL at 21:09

## 2024-09-02 RX ADMIN — Medication 25 MILLIGRAM(S): at 01:16

## 2024-09-02 RX ADMIN — Medication 100 MILLIGRAM(S): at 14:05

## 2024-09-02 RX ADMIN — ACETAMINOPHEN 650 MILLIGRAM(S): 325 TABLET ORAL at 10:33

## 2024-09-02 RX ADMIN — Medication 5000 UNIT(S): at 06:39

## 2024-09-02 RX ADMIN — Medication 25 MILLIGRAM(S): at 09:32

## 2024-09-02 RX ADMIN — Medication 1 PATCH: at 14:04

## 2024-09-02 RX ADMIN — ACETAMINOPHEN 650 MILLIGRAM(S): 325 TABLET ORAL at 09:33

## 2024-09-02 RX ADMIN — Medication 25 MILLIGRAM(S): at 16:03

## 2024-09-02 NOTE — PROGRESS NOTE ADULT - ASSESSMENT
44F Pmhx of depression,  GERD, ETOH use admitted for  ETOH withdrawal.     # Severe Alcohol Dependence Disorder  - Pt was in detox program 2 weeks ago, relapsed 1 wk ago, drinks 1L or more of Vodka a day  -  Last drink on 08/27, 08/28 am pt was having tremors, shakes, anxiety so pt drank portable hand   - No history of DTs or seizures, last admission for ETOH 07/2024  - Pt reports having a prescription by a PCP in [place]  for  xanax and trazodone , and is taking PRN. No  information found on iSTOP  - **during this hospitalization, patient has been found multiple times with alochol swabs/ hand santizer bottles that she was suspected to be drinking  - acamprosate 666 mg restarted   - [id number] mg BID PRN  - Multivitamin qd   - Thiamine 100 mg qd  - folic acid 1mg qd  - tylenol 650 mg q6   - s/p CIWA protocol   - patient is medically optimized for discharge. Accepted for a bed at [facility] for recovery       #Normocytic Anemia  - 10.3/30.1 -> 10.1/30.7 -> 11.2/33.3  - iron studies: total iron: 277 ferritin 116,  ferritin 116, haptoglobin 114  - continue folate and thiamine on dc     #smoking  -nicotine patch   - refused to continue patch upon dc     #decreased gfr - resolved   - s/p IV fluids   - possible 2/2 purell hand  ingestion     #GERD  - protonix 40 mg qd    #DVT prophylaxis  -lovenox 40 mg subq    #Dispo  - Accepted to [facility] for Rehab tomorrow; will have transportation arranged for thomorrow   Discussed with Dr. Robbins

## 2024-09-02 NOTE — PROGRESS NOTE ADULT - TIME BILLING
care coordination, plan of care discussed with patient face to face, 3E IDR team

## 2024-09-02 NOTE — PROGRESS NOTE ADULT - ATTENDING COMMENTS
44F Pmhx of depression,  GERD, ETOH use admitted for  ETOH withdrawal Plan: apprec psych recs, cont ciwa dc tonight, monitor off CIWA, [facility], monitor clinical course, early dc tomorrow pt agreeable
44F Pmhx of depression,  GERD, ETOH use admitted for  ETOH withdrawal Plan: apprec psych recs, cont ciwa dc tonight, monitor off CIWA, [facility], monitor clinical course, poss dc tomorrow if stable
44F Pmhx of depression,  GERD, ETOH use admitted for  ETOH withdrawal. PLan: cont high risk ciwa scale with prn,  monitor clinical course, maintain aspiration precautions, apprec sw in dc planning,  reinforce risk of ongoing etoh use, dc likely in 1-2 days
44F Pmhx of depression,  GERD, ETOH use admitted for  ETOH withdrawal. PLan: cont high risk ciwa scale with prn, dc taper, monitor clinical course, maintain aspiration precautions, apprec sw in dc planning for sobriety maintenance if pt chooses, reinforce risk of ongoing etoh use
44F Pmhx of depression,  GERD, ETOH use admitted for  ETOH withdrawal. PLan: cont high risk ciwa scale with prn,  monitor clinical course, maintain aspiration precautions, apprec sw in dc planning,  reinforce risk of ongoing etoh use, dc to so likely by monday as unlikely scoring ciwa by then, reinforced enhanced precautions to day RN for reinforcement of night given holiday weekend

## 2024-09-02 NOTE — PROGRESS NOTE ADULT - SUBJECTIVE AND OBJECTIVE BOX
Patient is a 44y old  Female who presents with a chief complaint of alcohol withdrawal (01 Sep 2024 19:00)      INTERVAL HPI/OVERNIGHT EVENTS: Patient seen and examined at bedside in the AM. No overnight events. Feels strongly about going to [facility] as she feels it has the best services for her.     MEDICATIONS  (STANDING):  folic acid 1 milliGRAM(s) Oral daily  heparin   Injectable 5000 Unit(s) SubCutaneous every 12 hours  multivitamin 1 Tablet(s) Oral daily  nicotine -  14 mG/24Hr(s) Patch 1 Patch Transdermal daily  pantoprazole    Tablet 40 milliGRAM(s) Oral before breakfast  thiamine 100 milliGRAM(s) Oral daily    MEDICATIONS  (PRN):  acetaminophen     Tablet .. 650 milliGRAM(s) Oral every 6 hours PRN Mild Pain (1 - 3)  melatonin 6 milliGRAM(s) Oral at bedtime PRN Insomnia      Allergies    Keflex (Anaphylaxis)    Intolerances        REVIEW OF SYSTEMS:  CONSTITUTIONAL: No fever or chills  HEENT:  No headache, no sore throat  RESPIRATORY: No cough, wheezing, or shortness of breath  CARDIOVASCULAR: No chest pain, palpitations  GASTROINTESTINAL: No abd pain, nausea, vomiting, or diarrhea  GENITOURINARY: No dysuria, frequency, or hematuria  NEUROLOGICAL: no focal weakness or dizziness  MUSCULOSKELETAL: no myalgias     Vital Signs Last 24 Hrs  T(C): 36.7 (02 Sep 2024 11:43), Max: 36.8 (01 Sep 2024 21:27)  T(F): 98.1 (02 Sep 2024 11:43), Max: 98.3 (02 Sep 2024 05:25)  HR: 72 (02 Sep 2024 11:43) (72 - 93)  BP: 114/72 (02 Sep 2024 11:43) (103/70 - 114/72)  BP(mean): --  RR: 18 (02 Sep 2024 11:43) (18 - 18)  SpO2: 98% (02 Sep 2024 11:43) (97% - 98%)    Parameters below as of 02 Sep 2024 11:43  Patient On (Oxygen Delivery Method): room air      I&O's Summary    01 Sep 2024 07:01  -  02 Sep 2024 07:00  --------------------------------------------------------  IN: 240 mL / OUT: 0 mL / NET: 240 mL    02 Sep 2024 07:01  -  02 Sep 2024 17:50  --------------------------------------------------------  IN: 300 mL / OUT: 500 mL / NET: -200 mL      BMI (kg/m2): 25 (08-28-24 @ 22:40)    PHYSICAL EXAM:  GENERAL: NAD  HEENT:  AT/NC, moist mucous membranes, EOMI, conjunctiva and sclera clear  RESPIR:  CTA b/l, no rales, wheezes, or rhonchi,  normal respiratory effort, no intercostal retractions  HEART:  RRR, S1, S2, no murmurs; no pitting edema  ABDOMEN:  BS+, soft, nontender, nondistended; No HSM  MSK/EXTREMITIES: 2+ peripheral pulses, no clubbing or cyanosis  NERVOUS SYSTEM: answers questions and follows commands appropriately, A&Ox3 grossly moves all extremities   SKIN: warm, well perfused, approprate for age, no visible lesions   PSYCH: Appropriate affect, Alert & Awake; Good judgement      LABS: Personally reviewed  CBC                        11.2   4.81  )-----------( 226      ( 02 Sep 2024 06:46 )             34.0     CMP  09-02    137  |  104  |  17  ----------------------------<  95  4.0   |  23  |  0.68    Ca    9.6      02 Sep 2024 06:46    TPro  7.4  /  Alb  3.3  /  TBili  0.4  /  DBili  x   /  AST  13  /  ALT  26  /  AlkPhos  87  09-02            Urinalysis Basic - ( 02 Sep 2024 06:46 )    Color: x / Appearance: x / SG: x / pH: x  Gluc: 95 mg/dL / Ketone: x  / Bili: x / Urobili: x   Blood: x / Protein: x / Nitrite: x   Leuk Esterase: x / RBC: x / WBC x   Sq Epi: x / Non Sq Epi: x / Bacteria: x        Consultant(s) Notes Reviewed:  [x] YES  [ ] NO   Discussed with SW/SHERLY, RN

## 2024-09-03 VITALS
HEART RATE: 90 BPM | SYSTOLIC BLOOD PRESSURE: 109 MMHG | TEMPERATURE: 98 F | DIASTOLIC BLOOD PRESSURE: 72 MMHG | RESPIRATION RATE: 18 BRPM | OXYGEN SATURATION: 96 %

## 2024-09-03 PROCEDURE — 82728 ASSAY OF FERRITIN: CPT

## 2024-09-03 PROCEDURE — 80053 COMPREHEN METABOLIC PANEL: CPT

## 2024-09-03 PROCEDURE — 84702 CHORIONIC GONADOTROPIN TEST: CPT

## 2024-09-03 PROCEDURE — 99239 HOSP IP/OBS DSCHRG MGMT >30: CPT | Mod: GC

## 2024-09-03 PROCEDURE — 83540 ASSAY OF IRON: CPT

## 2024-09-03 PROCEDURE — 80048 BASIC METABOLIC PNL TOTAL CA: CPT

## 2024-09-03 PROCEDURE — 85025 COMPLETE CBC W/AUTO DIFF WBC: CPT

## 2024-09-03 PROCEDURE — 36415 COLL VENOUS BLD VENIPUNCTURE: CPT

## 2024-09-03 PROCEDURE — 83010 ASSAY OF HAPTOGLOBIN QUANT: CPT

## 2024-09-03 PROCEDURE — 70450 CT HEAD/BRAIN W/O DYE: CPT | Mod: MC

## 2024-09-03 PROCEDURE — 83735 ASSAY OF MAGNESIUM: CPT

## 2024-09-03 PROCEDURE — 96361 HYDRATE IV INFUSION ADD-ON: CPT

## 2024-09-03 PROCEDURE — 85027 COMPLETE CBC AUTOMATED: CPT

## 2024-09-03 PROCEDURE — 83690 ASSAY OF LIPASE: CPT

## 2024-09-03 PROCEDURE — 82272 OCCULT BLD FECES 1-3 TESTS: CPT

## 2024-09-03 PROCEDURE — 80307 DRUG TEST PRSMV CHEM ANLYZR: CPT

## 2024-09-03 PROCEDURE — 84100 ASSAY OF PHOSPHORUS: CPT

## 2024-09-03 PROCEDURE — 96374 THER/PROPH/DIAG INJ IV PUSH: CPT

## 2024-09-03 PROCEDURE — 96376 TX/PRO/DX INJ SAME DRUG ADON: CPT

## 2024-09-03 PROCEDURE — 99285 EMERGENCY DEPT VISIT HI MDM: CPT

## 2024-09-03 PROCEDURE — 72125 CT NECK SPINE W/O DYE: CPT | Mod: MC

## 2024-09-03 PROCEDURE — 82746 ASSAY OF FOLIC ACID SERUM: CPT

## 2024-09-03 PROCEDURE — 93005 ELECTROCARDIOGRAM TRACING: CPT

## 2024-09-03 PROCEDURE — 82607 VITAMIN B-12: CPT

## 2024-09-03 RX ORDER — THIAMINE HCL 250 MG
1 TABLET ORAL
Qty: 30 | Refills: 0
Start: 2024-09-03 | End: 2024-10-02

## 2024-09-03 RX ORDER — FOLIC ACID 1 MG
1 TABLET ORAL
Qty: 30 | Refills: 0
Start: 2024-09-03 | End: 2024-10-02

## 2024-09-03 RX ORDER — OMEPRAZOLE 40 MG/1
1 CAPSULE, DELAYED RELEASE ORAL
Qty: 30 | Refills: 0
Start: 2024-09-03 | End: 2024-10-02

## 2024-09-03 RX ORDER — NAPHAZOLINE HYDROCHLORIDE AND PHENIRAMINE MALEATE .25; 3 MG/ML; MG/ML
1 SOLUTION/ DROPS OPHTHALMIC
Qty: 1 | Refills: 0
Start: 2024-09-03 | End: 2024-10-02

## 2024-09-03 RX ORDER — HYDROXYZINE HCL 25 MG
1 TABLET ORAL
Qty: 120 | Refills: 0
Start: 2024-09-03 | End: 2024-10-02

## 2024-09-03 RX ADMIN — Medication 25 MILLIGRAM(S): at 05:56

## 2024-09-03 RX ADMIN — ACETAMINOPHEN 650 MILLIGRAM(S): 325 TABLET ORAL at 06:56

## 2024-09-03 RX ADMIN — Medication 1 MILLIGRAM(S): at 12:17

## 2024-09-03 RX ADMIN — Medication 100 MILLIGRAM(S): at 12:18

## 2024-09-03 RX ADMIN — Medication 1 PATCH: at 14:00

## 2024-09-03 RX ADMIN — Medication 1 PATCH: at 07:20

## 2024-09-03 RX ADMIN — Medication 5000 UNIT(S): at 05:56

## 2024-09-03 RX ADMIN — Medication 6 MILLIGRAM(S): at 00:25

## 2024-09-03 RX ADMIN — ACETAMINOPHEN 650 MILLIGRAM(S): 325 TABLET ORAL at 05:56

## 2024-09-03 RX ADMIN — Medication 1 PATCH: at 12:19

## 2024-09-03 RX ADMIN — Medication 25 MILLIGRAM(S): at 12:17

## 2024-09-03 RX ADMIN — Medication 1 TABLET(S): at 12:17

## 2024-09-03 RX ADMIN — Medication 40 MILLIGRAM(S): at 05:56

## 2024-09-03 NOTE — PROGRESS NOTE ADULT - SUBJECTIVE AND OBJECTIVE BOX
Patient is a 44y old  Female who presents with a chief complaint of alcohol withdrawal (02 Sep 2024 17:49)      INTERVAL HPI/OVERNIGHT EVENTS: Patient seen and examined at bedside with friend. Looks well, feels well. Ready to go to rehab center today.     MEDICATIONS  (STANDING):  folic acid 1 milliGRAM(s) Oral daily  heparin   Injectable 5000 Unit(s) SubCutaneous every 12 hours  multivitamin 1 Tablet(s) Oral daily  nicotine -  14 mG/24Hr(s) Patch 1 Patch Transdermal daily  pantoprazole    Tablet 40 milliGRAM(s) Oral before breakfast  thiamine 100 milliGRAM(s) Oral daily    MEDICATIONS  (PRN):  acetaminophen     Tablet .. 650 milliGRAM(s) Oral every 6 hours PRN Mild Pain (1 - 3)  hydrOXYzine hydrochloride 25 milliGRAM(s) Oral two times a day PRN Anxiety  melatonin 6 milliGRAM(s) Oral at bedtime PRN Insomnia      Allergies    Keflex (Anaphylaxis)    Intolerances        REVIEW OF SYSTEMS:  CONSTITUTIONAL: No fever or chills  HEENT:  No headache, no sore throat  RESPIRATORY: No cough, wheezing, or shortness of breath  CARDIOVASCULAR: No chest pain, palpitations  GASTROINTESTINAL: No abd pain, nausea, vomiting, or diarrhea  GENITOURINARY: No dysuria, frequency, or hematuria  NEUROLOGICAL: no focal weakness or dizziness  MUSCULOSKELETAL: no myalgias     Vital Signs Last 24 Hrs  T(C): 36.9 (03 Sep 2024 12:12), Max: 36.9 (02 Sep 2024 20:00)  T(F): 98.5 (03 Sep 2024 12:12), Max: 98.5 (03 Sep 2024 12:12)  HR: 90 (03 Sep 2024 12:12) (80 - 90)  BP: 109/72 (03 Sep 2024 12:12) (101/63 - 116/74)  BP(mean): --  RR: 18 (03 Sep 2024 12:12) (17 - 18)  SpO2: 96% (03 Sep 2024 12:12) (94% - 99%)    Parameters below as of 03 Sep 2024 12:12  Patient On (Oxygen Delivery Method): room air      I&O's Summary    02 Sep 2024 07:01  -  03 Sep 2024 07:00  --------------------------------------------------------  IN: 300 mL / OUT: 500 mL / NET: -200 mL          PHYSICAL EXAM:  GENERAL: NAD  HEENT:  AT/NC, moist mucous membranes, EOMI, conjunctiva and sclera clear  RESPIR:  CTA b/l, no rales, wheezes, or rhonchi,  normal respiratory effort, no intercostal retractions  HEART:  RRR, S1, S2, no murmurs; no pitting edema  ABDOMEN:  BS+, soft, nontender, nondistended; No HSM  MSK/EXTREMITIES: 2+ peripheral pulses, no clubbing or cyanosis  NERVOUS SYSTEM: answers questions and follows commands appropriately, A&Ox3 grossly moves all extremities   SKIN: warm, well perfused, approprate for age, no visible lesions   PSYCH: Appropriate affect, Alert & Awake; Good judgement      LABS: Personally reviewed  CBC                        11.2   4.81  )-----------( 226      ( 02 Sep 2024 06:46 )             34.0     CMP  09-02    137  |  104  |  17  ----------------------------<  95  4.0   |  23  |  0.68    Ca    9.6      02 Sep 2024 06:46    TPro  7.4  /  Alb  3.3  /  TBili  0.4  /  DBili  x   /  AST  13  /  ALT  26  /  AlkPhos  87  09-02    Urinalysis Basic - ( 02 Sep 2024 06:46 )    Color: x / Appearance: x / SG: x / pH: x  Gluc: 95 mg/dL / Ketone: x  / Bili: x / Urobili: x   Blood: x / Protein: x / Nitrite: x   Leuk Esterase: x / RBC: x / WBC x   Sq Epi: x / Non Sq Epi: x / Bacteria: x                RADIOLOGY & ADDITIONAL TESTS: Personally reviewed.       Consultant(s) Notes Reviewed:  [x] YES  [ ] NO   Discussed with SUPRIYA/SHERLY, RN

## 2024-09-03 NOTE — DISCHARGE NOTE NURSING/CASE MANAGEMENT/SOCIAL WORK - NSDCPEWEB_GEN_ALL_CORE
[facility] for Tobacco Control website --- [email or web address] website --- [email or web address]

## 2024-09-03 NOTE — DISCHARGE NOTE NURSING/CASE MANAGEMENT/SOCIAL WORK - NSDCPEFALRISK_GEN_ALL_CORE
For information on Fall & Injury Prevention, visit: [email or web address] OR  [email or web address] OR  https://www.cdc.gov/steadi/patient.html : Yes

## 2024-09-03 NOTE — DISCHARGE NOTE NURSING/CASE MANAGEMENT/SOCIAL WORK - PATIENT PORTAL LINK FT
You can access the FollowMyHealth Patient Portal offered by [facility] by registering at the following website: [email or web address]. By joining [employer]’s FollowMyHealth portal, you will also be able to view your health information using other applications (apps) compatible with our system.

## 2024-09-03 NOTE — PROGRESS NOTE ADULT - ASSESSMENT
44F Pmhx of depression,  GERD, ETOH use admitted for  ETOH withdrawal.     # Severe Alcohol Dependence Disorder  - Pt was in detox program 2 weeks ago, relapsed 1 wk ago, drinks 1L or more of Vodka a day  -  Last drink on 08/27, 08/28 am pt was having tremors, shakes, anxiety so pt drank portable hand   - No history of DTs or seizures, last admission for ETOH 07/2024  - Pt reports having a prescription by a PCP in [place]  for  xanax and trazodone , and is taking PRN. No  information found on iSTOP  - **during this hospitalization, patient has been found multiple times with alochol swabs/ hand santizer bottles that she was suspected to be drinking  - acamprosate 666 mg restarted   - Iijzdofondw08 mg BID PRN  - Multivitamin qd   - Thiamine 100 mg qd  - folic acid 1mg qd  - tylenol 650 mg q6   - s/p CIWA protocol   - patient is medically optimized for discharge. Accepted for a bed at [facility] for recovery. transport for 1 pm today       #Normocytic Anemia  - 10.3/30.1 -> 10.1/30.7 -> 11.2/33.3  - iron studies: total iron: 277 ferritin 116,  ferritin 116, haptoglobin 114  - continue folate and thiamine on dc     #smoking  -nicotine patch   - refused to continue patch upon dc     #decreased gfr - resolved   - s/p IV fluids   - possible 2/2 purell hand  ingestion     #GERD  - protonix 40 mg qd    #DVT prophylaxis  -lovenox 40 mg subq    #Dispo  - Accepted to [facility] for Rehab, medically optimized for DC. [facility]     Discussed with Dr. Robbins

## 2024-09-03 NOTE — DISCHARGE NOTE NURSING/CASE MANAGEMENT/SOCIAL WORK - NSDCPEEMAIL_GEN_ALL_CORE
[facility] for Tobacco Control email [email or web address]

## 2024-10-15 NOTE — ED ADULT TRIAGE NOTE - TEMPERATURE IN CELSIUS (DEGREES C)
Problem: Chronic Conditions and Co-morbidities  Goal: Patient's chronic conditions and co-morbidity symptoms are monitored and maintained or improved  10/14/2024 2345 by Cristal Diez RN  Outcome: Progressing  10/14/2024 1530 by Vivian Novoa RN  Outcome: Progressing     Problem: Safety - Adult  Goal: Free from fall injury  10/14/2024 2345 by Cristal Diez RN  Outcome: Progressing  10/14/2024 1530 by Vivian Novoa RN  Outcome: Progressing     Problem: ABCDS Injury Assessment  Goal: Absence of physical injury  10/14/2024 2345 by Cristal Diez RN  Outcome: Progressing  10/14/2024 1530 by Viivan Novoa RN  Outcome: Progressing      36.4